# Patient Record
Sex: FEMALE | Race: WHITE | Employment: FULL TIME | URBAN - METROPOLITAN AREA
[De-identification: names, ages, dates, MRNs, and addresses within clinical notes are randomized per-mention and may not be internally consistent; named-entity substitution may affect disease eponyms.]

---

## 2021-02-25 ENCOUNTER — ANESTHESIA EVENT (OUTPATIENT)
Dept: SURGERY | Age: 41
End: 2021-02-25
Payer: COMMERCIAL

## 2021-02-25 RX ORDER — CELECOXIB 200 MG/1
200 CAPSULE ORAL ONCE
Status: CANCELLED | OUTPATIENT
Start: 2021-02-25 | End: 2021-02-25

## 2021-02-26 ENCOUNTER — ANESTHESIA (OUTPATIENT)
Dept: SURGERY | Age: 41
End: 2021-02-26
Payer: COMMERCIAL

## 2021-02-26 ENCOUNTER — HOSPITAL ENCOUNTER (OUTPATIENT)
Age: 41
Setting detail: OUTPATIENT SURGERY
Discharge: HOME | End: 2021-02-26
Attending: ORTHOPAEDIC SURGERY | Admitting: ORTHOPAEDIC SURGERY
Payer: COMMERCIAL

## 2021-02-26 VITALS
OXYGEN SATURATION: 100 % | DIASTOLIC BLOOD PRESSURE: 82 MMHG | BODY MASS INDEX: 43.1 KG/M2 | WEIGHT: 263 LBS | TEMPERATURE: 98.4 F | SYSTOLIC BLOOD PRESSURE: 137 MMHG | RESPIRATION RATE: 12 BRPM | HEART RATE: 63 BPM

## 2021-02-26 LAB — HCG UR QL: NEGATIVE

## 2021-02-26 PROCEDURE — 77030006788 HC BLD SAW OSC STRY -B: Performed by: ORTHOPAEDIC SURGERY

## 2021-02-26 PROCEDURE — 77030002933 HC SUT MCRYL J&J -A: Performed by: ORTHOPAEDIC SURGERY

## 2021-02-26 PROCEDURE — 77030010509 HC AIRWY LMA MSK TELE -A: Performed by: ANESTHESIOLOGY

## 2021-02-26 PROCEDURE — 77030033005 HC TBNG ARTHSC PMP STRY -B: Performed by: ORTHOPAEDIC SURGERY

## 2021-02-26 PROCEDURE — 74011250637 HC RX REV CODE- 250/637: Performed by: ANESTHESIOLOGY

## 2021-02-26 PROCEDURE — 76210000021 HC REC RM PH II 0.5 TO 1 HR: Performed by: ORTHOPAEDIC SURGERY

## 2021-02-26 PROCEDURE — 76060000034 HC ANESTHESIA 1.5 TO 2 HR: Performed by: ORTHOPAEDIC SURGERY

## 2021-02-26 PROCEDURE — 74011250636 HC RX REV CODE- 250/636: Performed by: ANESTHESIOLOGY

## 2021-02-26 PROCEDURE — C1713 ANCHOR/SCREW BN/BN,TIS/BN: HCPCS | Performed by: ORTHOPAEDIC SURGERY

## 2021-02-26 PROCEDURE — 77030002966 HC SUT PDS J&J -A: Performed by: ORTHOPAEDIC SURGERY

## 2021-02-26 PROCEDURE — 74011000250 HC RX REV CODE- 250: Performed by: REGISTERED NURSE

## 2021-02-26 PROCEDURE — 77030002991 HC SUT QUILL SSPC -B: Performed by: ORTHOPAEDIC SURGERY

## 2021-02-26 PROCEDURE — 74011000250 HC RX REV CODE- 250: Performed by: ANESTHESIOLOGY

## 2021-02-26 PROCEDURE — 2709999900 HC NON-CHARGEABLE SUPPLY: Performed by: ORTHOPAEDIC SURGERY

## 2021-02-26 PROCEDURE — 77030002934 HC SUT MCRYL J&J -B: Performed by: ORTHOPAEDIC SURGERY

## 2021-02-26 PROCEDURE — 76942 ECHO GUIDE FOR BIOPSY: CPT | Performed by: ORTHOPAEDIC SURGERY

## 2021-02-26 PROCEDURE — 77030040936 HC WND COBLATN S&N -C: Performed by: ORTHOPAEDIC SURGERY

## 2021-02-26 PROCEDURE — 77030040922 HC BLNKT HYPOTHRM STRY -A: Performed by: ANESTHESIOLOGY

## 2021-02-26 PROCEDURE — 77030006891 HC BLD SHV RESECT STRY -B: Performed by: ORTHOPAEDIC SURGERY

## 2021-02-26 PROCEDURE — 81025 URINE PREGNANCY TEST: CPT

## 2021-02-26 PROCEDURE — 77030002916 HC SUT ETHLN J&J -A: Performed by: ORTHOPAEDIC SURGERY

## 2021-02-26 PROCEDURE — 77030018986 HC SUT ETHBND4 J&J -B: Performed by: ORTHOPAEDIC SURGERY

## 2021-02-26 PROCEDURE — 77030000032 HC CUF TRNQT ZIMM -B: Performed by: ORTHOPAEDIC SURGERY

## 2021-02-26 PROCEDURE — 76210000006 HC OR PH I REC 0.5 TO 1 HR: Performed by: ORTHOPAEDIC SURGERY

## 2021-02-26 PROCEDURE — 74011250636 HC RX REV CODE- 250/636: Performed by: REGISTERED NURSE

## 2021-02-26 PROCEDURE — 76010010054 HC POST OP PAIN BLOCK

## 2021-02-26 PROCEDURE — 76010010054 HC POST OP PAIN BLOCK: Performed by: ORTHOPAEDIC SURGERY

## 2021-02-26 PROCEDURE — C1762 CONN TISS, HUMAN(INC FASCIA): HCPCS | Performed by: ORTHOPAEDIC SURGERY

## 2021-02-26 PROCEDURE — 76010000162 HC OR TIME 1.5 TO 2 HR INTENSV-TIER 1: Performed by: ORTHOPAEDIC SURGERY

## 2021-02-26 PROCEDURE — 74011250636 HC RX REV CODE- 250/636: Performed by: PHYSICIAN ASSISTANT

## 2021-02-26 PROCEDURE — 77030003602 HC NDL NRV BLK BBMI -B: Performed by: ANESTHESIOLOGY

## 2021-02-26 DEVICE — GRAFT BNE L10XW25XH10MM BNE TEND BNE PRESHAPED: Type: IMPLANTABLE DEVICE | Site: KNEE | Status: FUNCTIONAL

## 2021-02-26 DEVICE — BIORCI-HA INTERFERENCE SCREW, 7 MM                                    X 20 MM 7 MM HEAD STERILE
Type: IMPLANTABLE DEVICE | Site: KNEE | Status: FUNCTIONAL
Brand: BIORCI

## 2021-02-26 DEVICE — BIORCI-HA INTERFERENCE SCREW, 7 MM                                    X 25 MM 7 MM HEAD STERILE
Type: IMPLANTABLE DEVICE | Site: KNEE | Status: FUNCTIONAL
Brand: BIORCI

## 2021-02-26 RX ORDER — KETOROLAC TROMETHAMINE 30 MG/ML
INJECTION, SOLUTION INTRAMUSCULAR; INTRAVENOUS AS NEEDED
Status: DISCONTINUED | OUTPATIENT
Start: 2021-02-26 | End: 2021-02-26 | Stop reason: HOSPADM

## 2021-02-26 RX ORDER — SODIUM CHLORIDE, SODIUM LACTATE, POTASSIUM CHLORIDE, CALCIUM CHLORIDE 600; 310; 30; 20 MG/100ML; MG/100ML; MG/100ML; MG/100ML
100 INJECTION, SOLUTION INTRAVENOUS CONTINUOUS
Status: DISCONTINUED | OUTPATIENT
Start: 2021-02-26 | End: 2021-02-26 | Stop reason: HOSPADM

## 2021-02-26 RX ORDER — ACETAMINOPHEN 500 MG
1000 TABLET ORAL ONCE
Status: DISCONTINUED | OUTPATIENT
Start: 2021-02-26 | End: 2021-02-26 | Stop reason: HOSPADM

## 2021-02-26 RX ORDER — MIDAZOLAM HYDROCHLORIDE 1 MG/ML
2 INJECTION, SOLUTION INTRAMUSCULAR; INTRAVENOUS
Status: COMPLETED | OUTPATIENT
Start: 2021-02-26 | End: 2021-02-26

## 2021-02-26 RX ORDER — MIDAZOLAM HYDROCHLORIDE 1 MG/ML
INJECTION, SOLUTION INTRAMUSCULAR; INTRAVENOUS AS NEEDED
Status: DISCONTINUED | OUTPATIENT
Start: 2021-02-26 | End: 2021-02-26

## 2021-02-26 RX ORDER — MIDAZOLAM HYDROCHLORIDE 1 MG/ML
2 INJECTION, SOLUTION INTRAMUSCULAR; INTRAVENOUS
Status: DISCONTINUED | OUTPATIENT
Start: 2021-02-26 | End: 2021-02-26 | Stop reason: HOSPADM

## 2021-02-26 RX ORDER — DEXAMETHASONE SODIUM PHOSPHATE 4 MG/ML
INJECTION, SOLUTION INTRA-ARTICULAR; INTRALESIONAL; INTRAMUSCULAR; INTRAVENOUS; SOFT TISSUE
Status: COMPLETED | OUTPATIENT
Start: 2021-02-26 | End: 2021-02-26

## 2021-02-26 RX ORDER — OXYCODONE HYDROCHLORIDE 5 MG/1
5 TABLET ORAL
Status: COMPLETED | OUTPATIENT
Start: 2021-02-26 | End: 2021-02-26

## 2021-02-26 RX ORDER — FENTANYL CITRATE 50 UG/ML
INJECTION, SOLUTION INTRAMUSCULAR; INTRAVENOUS AS NEEDED
Status: DISCONTINUED | OUTPATIENT
Start: 2021-02-26 | End: 2021-02-26 | Stop reason: HOSPADM

## 2021-02-26 RX ORDER — DIPHENHYDRAMINE HYDROCHLORIDE 50 MG/ML
12.5 INJECTION, SOLUTION INTRAMUSCULAR; INTRAVENOUS
Status: DISCONTINUED | OUTPATIENT
Start: 2021-02-26 | End: 2021-02-26 | Stop reason: HOSPADM

## 2021-02-26 RX ORDER — LIDOCAINE HYDROCHLORIDE 10 MG/ML
0.1 INJECTION INFILTRATION; PERINEURAL AS NEEDED
Status: DISCONTINUED | OUTPATIENT
Start: 2021-02-26 | End: 2021-02-26 | Stop reason: HOSPADM

## 2021-02-26 RX ORDER — DEXAMETHASONE SODIUM PHOSPHATE 4 MG/ML
INJECTION, SOLUTION INTRA-ARTICULAR; INTRALESIONAL; INTRAMUSCULAR; INTRAVENOUS; SOFT TISSUE AS NEEDED
Status: DISCONTINUED | OUTPATIENT
Start: 2021-02-26 | End: 2021-02-26 | Stop reason: HOSPADM

## 2021-02-26 RX ORDER — ONDANSETRON 2 MG/ML
INJECTION INTRAMUSCULAR; INTRAVENOUS AS NEEDED
Status: DISCONTINUED | OUTPATIENT
Start: 2021-02-26 | End: 2021-02-26 | Stop reason: HOSPADM

## 2021-02-26 RX ORDER — PROPOFOL 10 MG/ML
INJECTION, EMULSION INTRAVENOUS AS NEEDED
Status: DISCONTINUED | OUTPATIENT
Start: 2021-02-26 | End: 2021-02-26 | Stop reason: HOSPADM

## 2021-02-26 RX ORDER — HYDROMORPHONE HYDROCHLORIDE 2 MG/ML
0.5 INJECTION, SOLUTION INTRAMUSCULAR; INTRAVENOUS; SUBCUTANEOUS
Status: DISCONTINUED | OUTPATIENT
Start: 2021-02-26 | End: 2021-02-26 | Stop reason: HOSPADM

## 2021-02-26 RX ORDER — NALOXONE HYDROCHLORIDE 0.4 MG/ML
0.1 INJECTION, SOLUTION INTRAMUSCULAR; INTRAVENOUS; SUBCUTANEOUS
Status: DISCONTINUED | OUTPATIENT
Start: 2021-02-26 | End: 2021-02-26 | Stop reason: HOSPADM

## 2021-02-26 RX ORDER — SODIUM CHLORIDE, SODIUM LACTATE, POTASSIUM CHLORIDE, CALCIUM CHLORIDE 600; 310; 30; 20 MG/100ML; MG/100ML; MG/100ML; MG/100ML
75 INJECTION, SOLUTION INTRAVENOUS CONTINUOUS
Status: DISCONTINUED | OUTPATIENT
Start: 2021-02-26 | End: 2021-02-26 | Stop reason: HOSPADM

## 2021-02-26 RX ORDER — CEFAZOLIN SODIUM/WATER 2 G/20 ML
2 SYRINGE (ML) INTRAVENOUS ONCE
Status: COMPLETED | OUTPATIENT
Start: 2021-02-26 | End: 2021-02-26

## 2021-02-26 RX ORDER — FENTANYL CITRATE 50 UG/ML
100 INJECTION, SOLUTION INTRAMUSCULAR; INTRAVENOUS
Status: COMPLETED | OUTPATIENT
Start: 2021-02-26 | End: 2021-02-26

## 2021-02-26 RX ORDER — HALOPERIDOL 5 MG/ML
1 INJECTION INTRAMUSCULAR
Status: DISCONTINUED | OUTPATIENT
Start: 2021-02-26 | End: 2021-02-26 | Stop reason: HOSPADM

## 2021-02-26 RX ORDER — LIDOCAINE HYDROCHLORIDE 20 MG/ML
INJECTION, SOLUTION EPIDURAL; INFILTRATION; INTRACAUDAL; PERINEURAL AS NEEDED
Status: DISCONTINUED | OUTPATIENT
Start: 2021-02-26 | End: 2021-02-26 | Stop reason: HOSPADM

## 2021-02-26 RX ORDER — FLUMAZENIL 0.1 MG/ML
0.2 INJECTION INTRAVENOUS
Status: DISCONTINUED | OUTPATIENT
Start: 2021-02-26 | End: 2021-02-26 | Stop reason: HOSPADM

## 2021-02-26 RX ADMIN — FENTANYL CITRATE 100 MCG: 50 INJECTION, SOLUTION INTRAMUSCULAR; INTRAVENOUS at 07:39

## 2021-02-26 RX ADMIN — ROPIVACAINE HYDROCHLORIDE 30 ML: 5 INJECTION, SOLUTION EPIDURAL; INFILTRATION; PERINEURAL at 07:52

## 2021-02-26 RX ADMIN — LIDOCAINE HYDROCHLORIDE 60 MG: 20 INJECTION, SOLUTION EPIDURAL; INFILTRATION; INTRACAUDAL; PERINEURAL at 08:41

## 2021-02-26 RX ADMIN — DEXAMETHASONE SODIUM PHOSPHATE 2 MG: 4 INJECTION, SOLUTION INTRAMUSCULAR; INTRAVENOUS at 07:52

## 2021-02-26 RX ADMIN — SODIUM CHLORIDE, SODIUM LACTATE, POTASSIUM CHLORIDE, AND CALCIUM CHLORIDE: 600; 310; 30; 20 INJECTION, SOLUTION INTRAVENOUS at 09:27

## 2021-02-26 RX ADMIN — DEXAMETHASONE SODIUM PHOSPHATE 8 MG: 4 INJECTION, SOLUTION INTRAMUSCULAR; INTRAVENOUS at 08:49

## 2021-02-26 RX ADMIN — OXYCODONE 5 MG: 5 TABLET ORAL at 10:50

## 2021-02-26 RX ADMIN — MIDAZOLAM 2 MG: 1 INJECTION INTRAMUSCULAR; INTRAVENOUS at 07:54

## 2021-02-26 RX ADMIN — MIDAZOLAM 2 MG: 1 INJECTION INTRAMUSCULAR; INTRAVENOUS at 07:39

## 2021-02-26 RX ADMIN — FENTANYL CITRATE 25 MCG: 50 INJECTION INTRAMUSCULAR; INTRAVENOUS at 09:34

## 2021-02-26 RX ADMIN — FENTANYL CITRATE 50 MCG: 50 INJECTION INTRAMUSCULAR; INTRAVENOUS at 08:57

## 2021-02-26 RX ADMIN — KETOROLAC TROMETHAMINE 30 MG: 30 INJECTION, SOLUTION INTRAMUSCULAR at 09:45

## 2021-02-26 RX ADMIN — HYDROMORPHONE HYDROCHLORIDE 0.5 MG: 2 INJECTION INTRAMUSCULAR; INTRAVENOUS; SUBCUTANEOUS at 10:20

## 2021-02-26 RX ADMIN — DEXAMETHASONE SODIUM PHOSPHATE 2 MG: 4 INJECTION, SOLUTION INTRAMUSCULAR; INTRAVENOUS at 07:56

## 2021-02-26 RX ADMIN — FENTANYL CITRATE 50 MCG: 50 INJECTION INTRAMUSCULAR; INTRAVENOUS at 08:36

## 2021-02-26 RX ADMIN — CEFAZOLIN 2 G: 1 INJECTION, POWDER, FOR SOLUTION INTRAVENOUS at 08:43

## 2021-02-26 RX ADMIN — ROPIVACAINE HYDROCHLORIDE 20 ML: 5 INJECTION, SOLUTION EPIDURAL; INFILTRATION; PERINEURAL at 07:56

## 2021-02-26 RX ADMIN — FENTANYL CITRATE 25 MCG: 50 INJECTION INTRAMUSCULAR; INTRAVENOUS at 09:31

## 2021-02-26 RX ADMIN — SODIUM CHLORIDE, SODIUM LACTATE, POTASSIUM CHLORIDE, AND CALCIUM CHLORIDE 100 ML/HR: 600; 310; 30; 20 INJECTION, SOLUTION INTRAVENOUS at 07:09

## 2021-02-26 RX ADMIN — ONDANSETRON 4 MG: 2 INJECTION INTRAMUSCULAR; INTRAVENOUS at 09:45

## 2021-02-26 RX ADMIN — FENTANYL CITRATE 50 MCG: 50 INJECTION INTRAMUSCULAR; INTRAVENOUS at 09:07

## 2021-02-26 RX ADMIN — PROPOFOL 200 MG: 10 INJECTION, EMULSION INTRAVENOUS at 08:41

## 2021-02-26 NOTE — OP NOTES
[address] [place] [address]  OPERATIVE REPORT    Name:  Winnie Sesay  MR#:  [id number]  :  1980  ACCOUNT #:  [de-identified]  DATE OF SERVICE:  2021    PREOPERATIVE DIAGNOSES:  1. Anterior cruciate ligament tear, left knee. 2.  Lateral meniscal tear and chondromalacia of lateral femoral condyle - lateral compartment, left knee. 3.  Medial meniscal tear - medial compartment, left knee. 4.  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 5.  Chondromalacia of patella and trochlea - patellofemoral compartment, right knee. 6.  Lateral meniscal tear and chondromalacia, lateral femoral condyle - lateral compartment, right knee. POSTOPERATIVE DIAGNOSES:  1. Anterior cruciate ligament tear, left knee. 2.  Lateral meniscal tear and chondromalacia of lateral femoral condyle - lateral compartment, left knee. 3.  Medial meniscal tear - medial compartment, left knee. 4.  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 5.  Chondromalacia of patella and trochlea - patellofemoral compartment, right knee. 6.  Lateral meniscal tear and chondromalacia, lateral femoral condyle - lateral compartment, right knee. PROCEDURE PERFORMED:  1.   Left anterior cruciate ligament reconstruction - CPT .  2.  Left knee arthroscopy with partial lateral meniscectomy and chondroplasty of lateral femoral condyle - lateral compartment - CPT 56823.  3.  Left knee arthroscopy with partial medial meniscectomy - medial compartment - CPT 92577.  4.  Left knee arthroscopy with abrasion chondroplasty of trochlea and chondroplasty of patella - patellofemoral compartment - CPT 44035.  5.  Right knee arthroscopy with abrasion arthroplasty of patella and chondroplasty of trochlea - patellofemoral compartment - CPT 96952.  6.  Right knee arthroscopy with partial lateral meniscectomy and chondroplasty of lateral femoral condyle - lateral compartment - CPT 32876.    SURGEON:  Jitendra Flores MD    ASSISTANT:  SIMEON Devlin    ANESTHESIA:  General.    FLUIDS:  Crystalloid. COMPLICATIONS:  None. SPECIMENS REMOVED:  None. IMPLANTS:  Yes, see record. ESTIMATED BLOOD LOSS:  Minimal.    FINDINGS:  The right knee showed a tear of the anterior horn and body of the lateral meniscus. There was some grade II, III chondromalacia of the lateral femoral condyle, 1.5 x 1.5 cm. There was grade II, III, IV chondromalacia of the patella and II, III chondromalacia of the trochlea, 2 x 2 cm. The left knee showed a complete tear of the anterior cruciate ligament. There was extensive tearing of the body of the medial meniscus and tearing of the body of the lateral meniscus. There was grade II, III chondromalacia, 1.5 x 2 cm, of the lateral femoral condyle. There was grade II, III, IV chondromalacia of the trochlea and II, III chondromalacia of the patella, 2 x 2 cm. PROCEDURE:  After informed consent, the patient was brought to the operating room and placed on the operating room table in the supine position. General anesthesia was administered without difficulty. Tourniquet was applied to the right and left upper thighs. Right and left knees and legs were prepped and draped in sterile fashion. Attention was directed to the right knee. Tourniquet was inflated. Standard inferomedial and inferolateral portals were made for scope and instruments. The knee was then arthroscoped in a sequential manner. The aforementioned findings were noted. Attention was directed to the patellofemoral compartment. A chondroplasty of the patella and trochlea was performed. All loose cartilage flaps were removed. There were no sharp edges or unstable fragments after the chondroplasty. There was an area of exposed bone and a contained defect on the patella. Abrasion arthroplasty was performed down to bleeding subchondral bone without difficulty.     Attention was directed to the lateral compartment of the knee. A partial lateral meniscectomy was performed. All the torn portion was removed. At the termination of the partial lateral meniscectomy, the remaining meniscal rim had a smooth contour. There were no sharp edges or unstable fragments. A chondroplasty of the lateral femoral condyle was performed back to smooth stable area. The knee was thoroughly irrigated. Portals were closed. Sterile dressings were applied. Tourniquet was deflated. Attention was directed to the left knee. Tourniquet was inflated. Standard inferomedial and inferolateral portals were made for scope and instruments. The knee was then arthroscoped in a sequential manner. The aforementioned findings were noted. Attention was directed to the patellofemoral compartment. A chondroplasty of the patella and trochlea was performed. All loose cartilage flaps were removed. There were no sharp edges or unstable fragments after the chondroplasty. There was an area of exposed bone and a contained defect on the trochlea. The pick was used to produce a microfracture every 3 mm in this area. The abrasion chondroplasty was performed without difficulty. Attention was directed to the medial compartment of the knee. A partial medial meniscectomy was performed. All of the torn portion was removed. At the termination of the partial medial meniscectomy, the remaining meniscal rim had a smooth contour. There were no sharp edges or unstable fragments. Attention was directed to the lateral compartment of the knee. A partial lateral meniscectomy was performed. All the torn portion was removed. There were no sharp edges or unstable fragments after the partial lateral meniscectomy. A chondroplasty of the lateral femoral condyle was performed back to smooth stable area. Attention was directed to the intercondylar notch. The ACL stump was debrided.   A notchplasty was performed all the way to the posterior periosteal fringe at the over-the-top position. A reference point was made just anterior to this at the 2 o'clock position for future femoral tunnel placement. A proximal medial incision was made over the tibia and a guide pin was placed from the proximal medial tibia up to the center of the old ACL footprint in line with the leading edge of the lateral meniscus just anterior to the medial tibial spine. This was over reamed. A femoral tunnel was made at the aforementioned reference point to a depth of 25 mm with a reamer. At the termination of the femoral tunnel preparation, there was a 1-2-mm posterior cortical rim with circumferential bone noted within the tunnel. The bone-tendon-bone allograft had been prepared in usual fashion on the back table. The graft was passed up through the knee and affixed on the femoral side with a 7 x 25-mm bioabsorbable interference screw. This had excellent purchase. TUG testing revealed stable fixation at this interface. The graft was then tensioned to the tibial bone plug sutures. A posterior drawer was applied with the knee in 20 degrees of flexion and it was affixed on the tibial side with a 7 x 20-mm bioabsorbable interference screw. This had excellent purchase. The graft was then viewed arthroscopically. It had excellent tension and anatomic position. It did not impinge through full range of motion. Clinically, the patient had full motion. She had a negative Lachman's with a firm endpoint and negative pivot. Knee was irrigated. Wounds were closed. Sterile dressings and a brace were applied. The patient was taken to the recovery room in stable condition. SIMEON Parks, assisted during the procedure. He was necessary for patient positioning during the procedure, especially given that it was bilateral and the size of the patient's extremities.   He was also responsible for wound closure, graft preparation, and assistance with the major portions of the operations. His presence decreased the operative time and potential complication rate.     MD TANNA [id number]  D:  02/26/2021 10:59  T:  02/26/2021 11:49  JOB #:  [id number]

## 2021-02-26 NOTE — H&P
Outpatient Surgery History and Physical:  Liliana Edge was seen and examined. CHIEF COMPLAINT:   Bilateral knee pain. PE:     Visit Vitals  /68 (BP 1 Location: Left arm, BP Patient Position: Supine)   Pulse 82   Temp 98.3 °F (36.8 °C)   Resp 18   Wt 119.3 kg (263 lb)   SpO2 95%   BMI 43.10 kg/m²       Heart:   Regular rhythm      Lungs:  Are clear      Past Medical History:    Patient Active Problem List    Diagnosis    JAGDISH (obstructive sleep apnea)    Gastroesophageal reflux disease    Hyperlipidemia    Morbid obesity with BMI of 50.0-59.9, adult (HCC)    Hypertension    Asthma    Chronic back pain       Surgical History:   Past Surgical History:   Procedure Laterality Date    HX CHOLECYSTECTOMY      HX CRANIOTOMY      HX KNEE ARTHROSCOPY Left     HX OTHER SURGICAL      ACL repair left new    HX TONSILLECTOMY         Social History: Patient  reports that she has quit smoking. She has never used smokeless tobacco. She reports current alcohol use. She reports that she does not use drugs. Family History:   Family History   Problem Relation Age of Onset    Hypertension Father     Diabetes Father     Cancer Father        Allergies: Reviewed per EMR  Allergies   Allergen Reactions    Ciprofloxacin Rash    Penicillins Rash       Medications:    No current facility-administered medications on file prior to encounter. Current Outpatient Medications on File Prior to Encounter   Medication Sig    traZODone (DESYREL) 100 mg tablet Take 100 mg by mouth nightly.  pravastatin (PRAVACHOL) 80 mg tablet Take 80 mg by mouth nightly.  NORETHINDRONE (MICRONOR, 28, PO) Take  by mouth nightly.  albuterol (PROVENTIL HFA, VENTOLIN HFA, PROAIR HFA) 90 mcg/actuation inhaler Take  by inhalation.  ranitidine (ZANTAC) 150 mg tablet Take 150 mg by mouth daily.  triamcinolone acetonide (KENALOG) 0.1 % topical cream Apply  to affected area two (2) times a day.  use thin layer       The surgery is planned for the right knee arthroscopy and left knee arthroscopy ACL reconstruction with BTB allograft. History and physical has been reviewed. The patient has been examined. There have been no significant clinical changes since the completion of the originally dated History and Physical.  Patient identified by surgeon; surgical site was confirmed by patient and surgeon. The patient is here today for outpatient surgery. I have examined the patient, no changes are noted in the patient's medical status. Necessity for the procedure/care is still present and the history and physical above is current. See the office notes for the full long term history of the problem. Please see the recent office notes for the musculoskeletal examination.     Signed By: SIMEON Lo     February 26, 2021 6:42 AM

## 2021-02-26 NOTE — PROGRESS NOTES
's pre-procedure visit and prayer with patient as requested.     Anmol Mcleod MDiv, BS  Board Certified

## 2021-02-26 NOTE — ANESTHESIA POSTPROCEDURE EVALUATION
Procedure(s):  RIGHT KNEE ARTHROSCOPY with partial lateral meniscectomy AND LEFT KNEE ARTHROSCOPY WITH ANTERIOR CRUCIATE LIGAMENT RECONSTRUCTION WITH ALLOGRAFT.     general    Anesthesia Post Evaluation      Multimodal analgesia: multimodal analgesia used between 6 hours prior to anesthesia start to PACU discharge  Patient location during evaluation: PACU  Patient participation: complete - patient participated  Level of consciousness: awake  Pain management: adequate  Airway patency: patent  Anesthetic complications: no  Cardiovascular status: acceptable  Respiratory status: acceptable, spontaneous ventilation and nonlabored ventilation  Hydration status: acceptable  Post anesthesia nausea and vomiting:  none      INITIAL Post-op Vital signs:   Vitals Value Taken Time   /84 02/26/21 1049   Temp 36.9 °C (98.4 °F) 02/26/21 1049   Pulse 72 02/26/21 1049   Resp 12 02/26/21 1049   SpO2 96 % 02/26/21 1049

## 2021-02-26 NOTE — ANESTHESIA PROCEDURE NOTES
Peripheral Block    Start time: 2/26/2021 7:54 AM  End time: 2/26/2021 7:56 AM  Performed by: Kye Carranza MD  Authorized by: Kye Carranza MD       Pre-procedure: Indications: at surgeon's request and post-op pain management    Preanesthetic Checklist: patient identified, risks and benefits discussed, site marked, timeout performed, anesthesia consent given and patient being monitored    Timeout Time: 07:53          Block Type:   Block Type:  Femoral single shot  Laterality:  Left  Monitoring:  Standard ASA monitoring, continuous pulse ox, frequent vital sign checks, heart rate, responsive to questions and oxygen  Injection Technique:  Single shot  Procedures: ultrasound guided and nerve stimulator    Patient Position: supine  Prep: chlorhexidine    Location:  Upper thigh  Needle Type:  Stimuplex  Needle Gauge:  22 G  Needle Localization:  Ultrasound guidance and nerve stimulator  Motor Response comment:   Motor Response: minimal motor response >0.4 mA   Medication Injected:  Ropivacaine 0.375% with epi 1:200,000 in NS injection, 20 mL (Mixture components: ropivacaine (PF) 5 mg/mL (0.5 %) Soln, . 75 mL; EPINEPHrine HCl (PF) 1 mg/mL (1 mL) Soln, . 005 mL; 0.9% sodium chloride Soln, .245 mL)  dexamethasone (DECADRON) 4 mg/mL injection, 2 mg  Med Admin Time: 2/26/2021 7:56 AM    Assessment:  Number of attempts:  1  Injection Assessment:  Incremental injection every 5 mL, local visualized surrounding nerve on ultrasound, negative aspiration for CSF, negative aspiration for blood, no paresthesia, no intravascular symptoms and ultrasound image on chart  Patient tolerance:  Patient tolerated the procedure well with no immediate complications

## 2021-02-26 NOTE — PERIOP NOTES
PACU DISCHARGE NOTE    Vital signs stable, pain well controlled, alert and oriented times three or at baseline, follow up per surgeon, no anesthetic complications. Discharge instructions and prescriptions for Oxycodone and Zofran given to pt, her mother, and her . Pt and her  asked for Oxycodone prescription to be shredded as pt is in pain management. Prescription shredded. Pt is tolerating PO and has had her IV removed intact. Pt to discharge door via wheelchair and left in care of her family.

## 2021-02-26 NOTE — ANESTHESIA PREPROCEDURE EVALUATION
Anesthetic History     PONV          Review of Systems / Medical History  Patient summary reviewed and pertinent labs reviewed    Pulmonary        Sleep apnea: No treatment    Asthma : well controlled       Neuro/Psych   Within defined limits          Comments: H/o benign brain tumor. Occipital region. Resected in 2009 and no sequelae.  Cardiovascular    Hypertension (Not currently on medication)              Exercise tolerance: >4 METS     GI/Hepatic/Renal     GERD (Rare symptoms): well controlled           Endo/Other        Morbid obesity     Other Findings              Physical Exam    Airway  Mallampati: II  TM Distance: > 6 cm  Neck ROM: normal range of motion   Mouth opening: Normal     Cardiovascular  Regular rate and rhythm,  S1 and S2 normal,  no murmur, click, rub, or gallop          Pertinent negatives: No murmur   Dental  No notable dental hx       Pulmonary  Breath sounds clear to auscultation               Abdominal  GI exam deferred       Other Findings            Anesthetic Plan    ASA: 3  Anesthesia type: general      Post-op pain plan if not by surgeon: peripheral nerve block single    Induction: Intravenous  Anesthetic plan and risks discussed with: Patient

## 2021-02-26 NOTE — DISCHARGE INSTRUCTIONS
Post-Operative Instructions   For  Anterior Cruciate Ligament Reconstruction  Phone:  [phone]    1. Unless otherwise instructed, you may place as much weight as tolerated on the operated leg. Use crutches to help with ambulation. 2.  If you do not have an \"Iceman\" type cooling unit, for the first 48-72 hours following surgery, use ice on the knee every two hours (while awake) for 20-30 minutes at a time to help prevent swelling and lessen pain. If you have a cooling unit, follow the instructions given to you- continually as much as possible the first 48-72 hours, then 3-4 times a day for 4 weeks. Elevate leg. 3. You have been given a hinged brace. Keep the brace locked in a straight position at all times until you begin therapy. 4. You may remove the brace to shower and wrap the dressings to keep them dry. 5. Use any pain medication as instructed. You should take your pain medication as soon as you feel the anesthetic wearing off. Do not wait until you are in severe pain to begin taking your pain medication. 6. You may have some side effects from your pain medication. If you have nausea, try taking your medication with food. For itching, you may take over the counter Benadryl. 7. Begin therapy as ordered    8. You may have been given a prescription for Zofran or Phenergan. This medication is used for nausea and vomiting. You do not need to get this prescription filled unless you have a problem. 9. If you have a problem, please call [address] [place] [address] at [phone]    [facility], [address] [place] [address], [facility] Post-Operative Instructions   For  Knee Arthroscopy  Phone:  [phone]    1. Unless otherwise instructed, you may place as much weight on your leg as you wish.     2. If you do not have an \"Iceman\" type cooling unit, for the first 48-72 hours following surgery, use ice on the knee every two hours (while awake) for 20-30 minutes at a time to help prevent swelling and lessen pain. If you have a cooling unit, follow the instructions given to you- continually as much as possible the first 48-72 hours, then 3-4 times a day for 4 weeks. Elevate leg. 3. Perform at least 30 to 50 leg-raising exercises twice a day. Begin exercise as soon as pain allows. Also perform gentle active motion of the knee as soon as pain allows. 4. On the third day after surgery, remove the dressing. Leave steri-strips on, they eventually will peel off.      5. Use any pain medication as instructed. You should take your pain medication as soon as you feel the anesthetic wearing off. Do not wait until you are in severe pain to begin taking your pain medication. 6. You may have some side effects from your pain medication. If you have nausea, try taking your medication with food. For itching, you may take over the counter Benadryl. 7. You may shower as soon as desired. The first three days after surgery, wrap the dressings in saran wrap to keep them dry when showering. 8. You may have been given a prescription for Zofran or Phenergan. This medication is used for nausea and vomiting. You do not need to get this prescription filled unless you have a problem. 9. If you have a problem, please call [address] [place] [address] at [phone]    [facility] ACTIVITY  · As tolerated and as directed by your doctor. · Bathe or shower as directed by your doctor. DIET  · Clear liquids until no nausea or vomiting; then light diet for the first day. · Advance to regular diet on second day, unless your doctor orders otherwise. · If nausea and vomiting continues, call your doctor. PAIN  · Take pain medication as directed by your doctor. · Call your doctor if pain is NOT relieved by medication.        CALL YOUR DOCTOR IF   · Excessive bleeding that does not stop after holding pressure over the area  · Temperature of 101 degrees F or above  · Excessive redness, swelling or bruising, and/ or green or yellow, smelly discharge from incision        After general anesthesia or intravenous sedation, for 24 hours or while taking prescription Narcotics:  · Limit your activities  · Do not drive and operate hazardous machinery  · Do not make important personal or business decisions  · Do  not drink alcoholic beverages  · If you have not urinated within 8 hours after discharge, please contact your surgeon on call.    *  Please give a list of your current medications to your Primary Care Provider.    *  Please update this list whenever your medications are discontinued, doses are      changed, or new medications (including over-the-counter products) are added.    *  Please carry medication information at all times in case of emergency situations.      These are general instructions for a healthy lifestyle:    No smoking/ No tobacco products/ Avoid exposure to second hand smoke    Surgeon General's Warning:  Quitting smoking now greatly reduces serious risk to your health.    Obesity, smoking, and sedentary lifestyle greatly increases your risk for illness    A healthy diet, regular physical exercise & weight monitoring are important for maintaining a healthy lifestyle    You may be retaining fluid if you have a history of heart failure or if you experience any of the following symptoms:  Weight gain of 3 pounds or more overnight or 5 pounds in a week, increased swelling in our hands or feet or shortness of breath while lying flat in bed.  Please call your doctor as soon as you notice any of these symptoms; do not wait until your next office visit.    Recognize signs and symptoms of STROKE:    F-face looks uneven    A-arms unable to move or move unevenly    S-speech slurred or non-existent    T-time-call 911 as soon as signs and symptoms begin-DO NOT go       Back to bed or wait to see if you get better-TIME IS  BRAIN. Advance Care Planning  People with COVID-19 may have no symptoms, mild symptoms, such as fever, cough, and shortness of breath or they may have more severe illness, developing severe and fatal pneumonia. As a result, Advance Care Planning with attention to naming a health care decision maker (someone you trust to make healthcare decisions for you if you could not speak for yourself) and sharing other health care preferences is important BEFORE a possible health crisis. Please contact your Primary Care Provider to discuss Advance Care Planning. Preventing the Spread of Coronavirus Disease 2019 in Homes and Residential Communities  For the most recent information go to [email or web address]    Prevention steps for People with confirmed or suspected COVID-19 (including persons under investigation) who do not need to be hospitalized  and   People with confirmed COVID-19 who were hospitalized and determined to be medically stable to go home    Your healthcare provider and public health staff will evaluate whether you can be cared for at home. If it is determined that you do not need to be hospitalized and can be isolated at home, you will be monitored by staff from your local or state health department. You should follow the prevention steps below until a healthcare provider or local or state health department says you can return to your normal activities. Stay home except to get medical care  People who are mildly ill with COVID-19 are able to isolate at home during their illness. You should restrict activities outside your home, except for getting medical care. Do not go to work, school, or public areas. Avoid using public transportation, ride-sharing, or taxis. Separate yourself from other people and animals in your home  People: As much as possible, you should stay in a specific room and away from other people in your home.  Also, you should use a separate bathroom, if available. Animals: You should restrict contact with pets and other animals while you are sick with COVID-19, just like you would around other people. Although there have not been reports of pets or other animals becoming sick with COVID-19, it is still recommended that people sick with COVID-19 limit contact with animals until more information is known about the virus. When possible, have another member of your household care for your animals while you are sick. If you are sick with COVID-19, avoid contact with your pet, including petting, snuggling, being kissed or licked, and sharing food. If you must care for your pet or be around animals while you are sick, wash your hands before and after you interact with pets and wear a facemask. Call ahead before visiting your doctor  If you have a medical appointment, call the healthcare provider and tell them that you have or may have COVID-19. This will help the healthcare providers office take steps to keep other people from getting infected or exposed. Wear a facemask  You should wear a facemask when you are around other people (e.g., sharing a room or vehicle) or pets and before you enter a healthcare providers office. If you are not able to wear a facemask (for example, because it causes trouble breathing), then people who live with you should not stay in the same room with you, or they should wear a facemask if they enter your room. Cover your coughs and sneezes  Cover your mouth and nose with a tissue when you cough or sneeze. Throw used tissues in a lined trash can. Immediately wash your hands with soap and water for at least 20 seconds or, if soap and water are not available, clean your hands with an alcohol-based hand  that contains at least 60% alcohol.   Clean your hands often  Wash your hands often with soap and water for at least 20 seconds, especially after blowing your nose, coughing, or sneezing; going to the bathroom; and before eating or preparing food. If soap and water are not readily available, use an alcohol-based hand  with at least 60% alcohol, covering all surfaces of your hands and rubbing them together until they feel dry. Soap and water are the best option if hands are visibly dirty. Avoid touching your eyes, nose, and mouth with unwashed hands. Avoid sharing personal household items  You should not share dishes, drinking glasses, cups, eating utensils, towels, or bedding with other people or pets in your home. After using these items, they should be washed thoroughly with soap and water. Clean all high-touch surfaces everyday  High touch surfaces include counters, tabletops, doorknobs, bathroom fixtures, toilets, phones, keyboards, tablets, and bedside tables. Also, clean any surfaces that may have blood, stool, or body fluids on them. Use a household cleaning spray or wipe, according to the label instructions. Labels contain instructions for safe and effective use of the cleaning product including precautions you should take when applying the product, such as wearing gloves and making sure you have good ventilation during use of the product. Monitor your symptoms  Seek prompt medical attention if your illness is worsening (e.g., difficulty breathing). Before seeking care, call your healthcare provider and tell them that you have, or are being evaluated for, COVID-19. Put on a facemask before you enter the facility. These steps will help the healthcare providers office to keep other people in the office or waiting room from getting infected or exposed. Ask your healthcare provider to call the local or state health department. Persons who are placed under active monitoring or facilitated self-monitoring should follow instructions provided by their local health department or occupational health professionals, as appropriate. When working with your local health department check their available hours.   If you have a medical emergency and need to call 911, notify the dispatch personnel that you have, or are being evaluated for COVID-19. If possible, put on a facemask before emergency medical services arrive. Discontinuing home isolation  Patients with confirmed COVID-19 should remain under home isolation precautions until the risk of secondary transmission to others is thought to be low. The decision to discontinue home isolation precautions should be made on a case-by-case basis, in consultation with healthcare providers and state and local health departments.

## 2021-02-26 NOTE — ANESTHESIA PROCEDURE NOTES
Peripheral Block    Start time: 2/26/2021 7:40 AM  End time: 2/26/2021 7:52 AM  Performed by: Sarrah Fleischer, MD  Authorized by: Sarrah Fleischer, MD       Pre-procedure: Indications: at surgeon's request and post-op pain management    Preanesthetic Checklist: patient identified, risks and benefits discussed, site marked, timeout performed, anesthesia consent given and patient being monitored    Timeout Time: 07:38          Block Type:   Block Type:  Sciatic single shot  Laterality:  Left  Monitoring:  Standard ASA monitoring, continuous pulse ox, frequent vital sign checks, heart rate, oxygen and responsive to questions  Injection Technique:  Single shot  Procedures: ultrasound guided and nerve stimulator    Patient Position: supine  Prep: chlorhexidine    Location:  Upper thigh  Needle Type:  Stimuplex  Needle Gauge:  22 G  Needle Localization:  Anatomical landmarks, nerve stimulator and ultrasound guidance  Motor Response comment:   Motor Response: minimal motor response >0.4 mA   Medication Injected:  Ropivacaine 0.375% with epi 1:200,000 in NS injection, 30 mL (Mixture components: ropivacaine (PF) 5 mg/mL (0.5 %) Soln, . 75 mL; EPINEPHrine HCl (PF) 1 mg/mL (1 mL) Soln, . 005 mL; 0.9% sodium chloride Soln, .245 mL)  dexamethasone (DECADRON) 4 mg/mL injection, 2 mg  Med Admin Time: 2/26/2021 7:52 AM    Assessment:  Number of attempts:  1  Injection Assessment:  Incremental injection every 5 mL, local visualized surrounding nerve on ultrasound, negative aspiration for blood, no paresthesia, no intravascular symptoms and ultrasound image on chart  Patient tolerance:  Patient tolerated the procedure well with no immediate complications

## 2021-03-01 NOTE — PERIOP NOTES
Spoke with the mother and she said that the left knee is swollen than the right knee. She will call the  Doctor and let her know.

## 2021-03-02 ENCOUNTER — HOSPITAL ENCOUNTER (OUTPATIENT)
Dept: PHYSICAL THERAPY | Age: 41
Discharge: HOME | End: 2021-03-02
Payer: COMMERCIAL

## 2021-03-02 PROCEDURE — 97016 VASOPNEUMATIC DEVICE THERAPY: CPT

## 2021-03-02 PROCEDURE — 97110 THERAPEUTIC EXERCISES: CPT

## 2021-03-02 PROCEDURE — 97162 PT EVAL MOD COMPLEX 30 MIN: CPT

## 2021-03-02 NOTE — PROGRESS NOTES
Ada Resendiz  : 1980  Primary: [facility]  Secondary:  [address] @ [address], [facility] [place].  [phone]   [phone]      OUTPATIENT PHYSICAL THERAPY: Daily Treatment Note  3/2/2021   ICD-10: Treatment Diagnosis: Pain in right knee (M25.561), Stiffness of right knee, not elsewhere classified (M25.661), Pain in left knee (M25.562) and Stiffness of left knee, not elsewhere classified (M25.662) and Muscle weakness (generalized) (M62.81) and Other abnormalities of gait and mobility (R26.89)     L ACL allograft, B chondroplasty and meniscectomy 21    Effective Dates: 3/2/2021 TO 2021 (90 days). Frequency/Duration: 2-3 times a week for 90 Days  GOALS: (Goals have been discussed and agreed upon with patient.)  Short-Term Functional Goals: Time Frame: 4 weeks  1. Pt to report compliance with HEP  2. Pt to achieve 0-125 AROM B knees for normal function and mechanics  3. Pt to demonstrate normal gait level surfaces without assistive device  4. Pt to jun progressive ex program without increased pain or swelling. Discharge Goals: Time Frame: 16 weeks  1. Pt to increase strength for reciprocal gait on stairs  2. Pt to increase SLS > 20 sec B for safe amb all surfaces  3. Pt to resume working without limitation inc navigating stairs consistently  4. Pt to resume walking dogs without limitations  _______________________________________________________________________________  Pre-treatment Symptoms/Complaints:  Ms. Ailyn Fernandez presents with B knee pain, stiffness, weakness, swelling, decreased balance and gait abnormalities following B meniscectomies, chondroplasty, and L ACL reconstruction.    Pain: Initial: 4/10 Post Session:  1/10   Medications Last Reviewed:  3/2/2021    Updated Objective Findings:      Observation/Orthostatic Postural Assessment:          Steri-strips intact R med and lat portals; dressing with tegaderm intact over L knee with thick black dried blood contained;  Wearing brace locked at 0 on L   Palpation:          Good patellar mobility B  ROM:         AROM  Knee flex-ext R 102-0, 83-0 L        Calf tightness B  Strength:         Good quad set R, fair quad set L;  Further strength testing deferred at today's visit  Functional Mobility:         Gait/Ambulation:  Pt leans on B axillary crutches with step-to gait pattern, brace at 0        Transfers:  Pt uses UE assist and R LE         Stairs:  Step-to pattern or avoids currrently  Balance:          Unable to assess at today's visit    Tool Used: Lower Extremity Functional Scale (LEFS)  Score:  Initial: 8/80 Most Recent: X/80 (Date: -- )   Interpretation of Score: 20 questions each scored on a 5 point scale with 0 representing \"extreme difficulty or unable to perform\" and 4 representing \"no difficulty\". The lower the score, the greater the functional disability. 80/80 represents no disability. Minimal detectable change is 9 points. All objective findings are as at initial evaluation unless otherwise noted     TREATMENT:   THERAPEUTIC ACTIVITY: ( see below for minutes): Therapeutic activities per grid below to improve mobility. Required moderate verbal and manual cues to improve functional mobility . THERAPEUTIC EXERCISE: (see below for minutes):  Exercises per grid below to improve strength. Required moderate verbal and manual cues to promote proper body alignment, promote proper body posture, promote proper body mechanics and promote proper body breathing techniques. Progressed resistance, range, repetitions and complexity of movement as indicated. MANUAL THERAPY: (see below for minutes): Joint mobilization and Soft tissue mobilization was utilized and necessary because of the patient's restricted joint motion, painful spasm, loss of articular motion and restricted motion of soft tissue.    MODALITIES: (see below for minutes):      for pain modulation    AQUATIC THERAPY (see below for minutes): Aquatic treatment performed per flow grid for Decreased muscle strength, Decreased endurance, Decreased static/dynamic balance and reactive control, Decreased activity endurance, Decompression, Ease of movement and Low impact and reduced weight bearing activity. Date: 3/2/21       Modalities: 15 mins       Game ready B knees seated               Manual Therapy:                        Therapeutic Exercises: 25 mins       Pt education, postural education, HEP, functional breathing, patella mobs 15 mins       Quad set B X 10       SLR B X 10       gastroc stretch B Actively or with strap       Gait mechanics level surfaces With crutches               HEP: see hand out    [employer] Portal  Treatment/Session Summary:    · Response to Treatment:  Pt had good understanding and jun to information presented. We discussed possibly using a walker for more stability as pt was somewhat unsteady with crutches. Worked on gait mechanics so pt to decide how she feels the safest going forward. Also discussed using stockinette vs ace wrap vs compression sock as the ace wrap and stockinette both roll down creating a tight band behind the knee. Pt to monitor this to avoid the constriction behind the knee. · Communication/Consultation:  None today  · Equipment provided today:  stockinettes issued B  · Recommendations/Intent for next treatment session: Next visit will focus on ROM, strengthening, pain and edema management as indicated.     Total Treatment Billable Duration:  60 mins  PT Patient Time In/Time Out  Time In: 6683  Time Out: 35 [facility], PT    Future Appointments   Date Time Provider Tamara Brewer   3/5/2021  2:00 PM Joy Dao DPT [facility]   3/8/2021  4:15 PM Joy Dao DPT [facility]   3/10/2021  4:15 PM Joy Dao DPT [facility]   3/12/2021  4:15 PM Keo aB PTA [facility]   3/22/2021  4:15 PM Mateo Cazares Moraima RYDER, PTA [facility]   3/24/2021  2:45 PM Ghanshyam Mena, DPT [facility]   3/26/2021  3:30 PM Ghanshyam eMna, SONIAT [facility]   3/29/2021  4:15 PM Moraima Grant, PTA [facility]   3/31/2021 11:00 AM Ghanshyam Mena DPT [facility]

## 2021-03-02 NOTE — THERAPY EVALUATION
Martha Resendiz  : 1980 [address], [address] [place] [address]  [phone]   [phone]        OUTPATIENT PHYSICAL THERAPY:Initial Assessment 3/2/2021         ICD-10: Treatment Diagnosis: Pain in right knee (M25.561), Stiffness of right knee, not elsewhere classified (M25.661), Pain in left knee (M25.562) and Stiffness of left knee, not elsewhere classified (M25.662) and Muscle weakness (generalized) (M62.81) and Other abnormalities of gait and mobility (R26.89)  Precautions/Allergies:   Ciprofloxacin and Penicillins   Fall Risk Score:     Ambulatory/Rehab Services H2 Model Falls Risk Assessment    Risk Factors:       No Risk Factors Identified Ability to Rise from Chair:       (1)  Pushes up, successful in one attempt    Falls Prevention Plan:       No modifications necessary   Total: (5 or greater = High Risk): 1     [facility] [employer]. All Rights Reserved. [facility] [employer] Patent #[id number]. Federal Law prohibits the replication, distribution or use without written permission from H?REL     MD Orders: eval and treat MEDICAL/REFERRING DIAGNOSIS:  Pain in right knee [M25.561]  Pain in left knee [M25.562]   DATE OF ONSET: 21 surgery  REFERRING PHYSICIAN: Ilsa Vila MD  RETURN PHYSICIAN APPOINTMENT: 3/4/21     INITIAL ASSESSMENT:  Ms. Lisa Bhatti presents with B knee pain, stiffness, weakness, swelling, decreased balance and gait abnormalities following B meniscectomies, chondroplasty, and L ACL reconstruction. She will benefit from skilled PT to address these deficits and allow pt to resume premorbid activities without instability inc working at [employer] and walking her dogs. PROBLEM LIST (Impacting functional limitations):  1. Decreased Strength  2. Decreased ADL/Functional Activities  3. Decreased Transfer Abilities  4. Decreased Ambulation Ability/Technique  5.  Decreased Balance  6. Increased Pain  7. Decreased Flexibility/Joint Mobility  8. Decreased Knowledge of Precautions  9. Decreased Hext with Home Exercise Program INTERVENTIONS PLANNED:  1. Balance Exercise  2. Cryotherapy  3. Home Exercise Program (HEP)  4. Manual Therapy  5. Range of Motion (ROM)  6. Therapeutic Activites  7. Therapeutic Exercise/Strengthening    TREATMENT PLAN:  Effective Dates: 3/2/2021 TO 5/31/2021 (90 days). Frequency/Duration: 2-3 times a week for 90 Days  GOALS: (Goals have been discussed and agreed upon with patient.)  Short-Term Functional Goals: Time Frame: 4 weeks  1. Pt to report compliance with HEP  2. Pt to achieve 0-125 AROM B knees for normal function and mechanics  3. Pt to demonstrate normal gait level surfaces without assistive device  4. Pt to jun progressive ex program without increased pain or swelling. Discharge Goals: Time Frame: 16 weeks  1. Pt to increase strength for reciprocal gait on stairs  2. Pt to increase SLS > 20 sec B for safe amb all surfaces  3. Pt to resume working without limitation inc navigating stairs consistently  4. Pt to resume walking dogs without limitations  Rehabilitation Potential For Stated Goals: Good  Regarding Kanchan Resendiz's therapy, I certify that the treatment plan above will be carried out by a therapist or under their direction. Thank you for this referral,  Johanne Baker PT       Referring Physician Signature: Fadi Gibbons MD              Date                      HISTORY:   History of Present Injury/Illness (Reason for Referral):  Pt had an ACL reconstruction of L knee late 90's. She started having issues and knee pain with instability. She found out the lig was torn again so she had it reconstructed again with an allograft. She also had B meniscectomies with chondroplasty. She fell the first day home trying to go to the bathroom. She thinks both knees bent backwards.   She called the doctor and was told not to worry if it did not bleed more. The R leg has swollen more than the L. No hobbies except walking dogs. Past Medical History/Comorbidities:   Ms. Tor Figueroa  has a past medical history of Anxiety, Asthma, Bradycardia, Brain tumor ([address] Depression, GERD (gastroesophageal reflux disease), Hypertension, Nausea & vomiting, and Sleep apnea. Ms. Tor Figueroa  has a past surgical history that includes hx other surgical; hx cholecystectomy; hx craniotomy; hx knee arthroscopy (Left); and hx tonsillectomy. Social History/Living Environment:     pt lives in single story home with family  Prior Level of Function/Work/Activity:  Works FT at [employer] but currently OOW  Dominant Side:         RIGHT  Current Medications:    Current Outpatient Medications:     venlafaxine (EFFEXOR) 75 mg tablet, Take 25 mg by mouth nightly., Disp: , Rfl:     oxyCODONE-acetaminophen (Percocet) 5-325 mg per tablet, Take  by mouth every four (4) hours as needed for Pain., Disp: , Rfl:     meclizine (ANTIVERT) 12.5 mg tablet, Take  by mouth nightly., Disp: , Rfl:     cetirizine (ZyrTEC) 10 mg tablet, Take  by mouth., Disp: , Rfl:     ergocalciferol (Vitamin D2) 1,250 mcg (50,000 unit) capsule, Take 50,000 Units by mouth every seven (7) days. , Disp: , Rfl:     melatonin 3 mg tablet, Take  by mouth., Disp: , Rfl:     traZODone (DESYREL) 100 mg tablet, Take 100 mg by mouth nightly., Disp: , Rfl:     pravastatin (PRAVACHOL) 80 mg tablet, Take 80 mg by mouth nightly., Disp: , Rfl:     NORETHINDRONE (MICRONOR, 28, PO), Take  by mouth nightly., Disp: , Rfl:     triamcinolone acetonide (KENALOG) 0.1 % topical cream, Apply  to affected area two (2) times a day. use thin layer, Disp: , Rfl:     albuterol (PROVENTIL HFA, VENTOLIN HFA, PROAIR HFA) 90 mcg/actuation inhaler, Take  by inhalation. , Disp: , Rfl:     ranitidine (ZANTAC) 150 mg tablet, Take 150 mg by mouth daily. , Disp: , Rfl:    Date Last Reviewed:  3/2/2021   # of Personal Factors/Comorbidities that affect the Plan of Care: 1-2: MODERATE COMPLEXITY   EXAMINATION:   Observation/Orthostatic Postural Assessment:          Steri-strips intact R med and lat portals; dressing with tegaderm intact over L knee with thick black dried blood contained;  Wearing brace locked at 0 on L   Palpation:          Good patellar mobility B  ROM:         AROM  Knee flex-ext R 102-0, 83-0 L        Calf tightness B  Strength:         Good quad set R, fair quad set L;  Further strength testing deferred at today's visit  Neurological Screen:        unremarkable  Functional Mobility:         Gait/Ambulation:  Pt leans on B axillary crutches with step-to gait pattern, brace at 0        Transfers:  Pt uses UE assist and R LE         Stairs:  Step-to pattern or avoids currrently  Balance:          Unable to assess at today's visit   Body Structures Involved:  1. Bones  2. Joints  3. Muscles  4. Ligaments Body Functions Affected:  1. Sensory/Pain  2. Movement Related Activities and Participation Affected:  1. General Tasks and Demands  2. Mobility  3. Self Care  4. Domestic Life  5. Interpersonal Interactions and Relationships  6. Community, Social and [place]   # of elements that affect the Plan of Care: 3: MODERATE COMPLEXITY   CLINICAL PRESENTATION:   Presentation: Evolving clinical presentation with changing clinical characteristics: MODERATE COMPLEXITY   CLINICAL DECISION MAKING:   Tool Used: Lower Extremity Functional Scale (LEFS)  Score:  Initial: 8/80 Most Recent: X/80 (Date: -- )   Interpretation of Score: 20 questions each scored on a 5 point scale with 0 representing \"extreme difficulty or unable to perform\" and 4 representing \"no difficulty\". The lower the score, the greater the functional disability. 80/80 represents no disability. Minimal detectable change is 9 points. Medical Necessity:   · Patient demonstrates good rehab potential due to higher previous functional level.   Reason for Services/Other Comments:  · Patient continues to require present interventions due to patient's inability to function without pain and instability limiting her.    Use of outcome tool(s) and clinical judgement create a POC that gives a: Questionable prediction of patient's progress: MODERATE COMPLEXITY     Total Treatment Duration:  PT Patient Time In/Time Out  Time In: 0215  Time Out: 0315     Eliecer Viera, PT

## 2021-03-04 ENCOUNTER — APPOINTMENT (OUTPATIENT)
Dept: PHYSICAL THERAPY | Age: 41
End: 2021-03-04
Payer: COMMERCIAL

## 2021-03-05 ENCOUNTER — APPOINTMENT (OUTPATIENT)
Dept: PHYSICAL THERAPY | Age: 41
End: 2021-03-05
Payer: COMMERCIAL

## 2021-03-05 ENCOUNTER — HOSPITAL ENCOUNTER (OUTPATIENT)
Dept: PHYSICAL THERAPY | Age: 41
Discharge: HOME | End: 2021-03-05
Payer: COMMERCIAL

## 2021-03-05 PROCEDURE — 97110 THERAPEUTIC EXERCISES: CPT

## 2021-03-05 PROCEDURE — 97016 VASOPNEUMATIC DEVICE THERAPY: CPT

## 2021-03-05 NOTE — PROGRESS NOTES
Called to phi   Fidencio Resendiz  : 1980  Primary: [facility]  Secondary:  [address] @ [address], [facility] [place].  [phone]   [phone]      OUTPATIENT PHYSICAL THERAPY: Daily Treatment Note  3/5/2021   ICD-10: Treatment Diagnosis: Pain in right knee (M25.561), Stiffness of right knee, not elsewhere classified (M25.661), Pain in left knee (M25.562) and Stiffness of left knee, not elsewhere classified (M25.662) and Muscle weakness (generalized) (M62.81) and Other abnormalities of gait and mobility (R26.89)     L ACL allograft, B chondroplasty and meniscectomy 21    Effective Dates: 3/2/2021 TO 2021 (90 days). Frequency/Duration: 2-3 times a week for 90 Days  GOALS: (Goals have been discussed and agreed upon with patient.)  Short-Term Functional Goals: Time Frame: 4 weeks  1. Pt to report compliance with HEP  2. Pt to achieve 0-125 AROM B knees for normal function and mechanics  3. Pt to demonstrate normal gait level surfaces without assistive device  4. Pt to jun progressive ex program without increased pain or swelling. Discharge Goals: Time Frame: 16 weeks  1. Pt to increase strength for reciprocal gait on stairs  2. Pt to increase SLS > 20 sec B for safe amb all surfaces  3. Pt to resume working without limitation inc navigating stairs consistently  4. Pt to resume walking dogs without limitations  _______________________________________________________________________________  Pre-treatment Symptoms/Complaints:  Notes the knee is doing ok. Had follow up with MD yesterday with no concerns. Still lacks confidence with crutch ambulation.    Pain: Initial: 4/10 Post Session:  1/10   Medications Last Reviewed:  3/5/2021    Updated Objective Findings:      Observation/Orthostatic Postural Assessment:          Steri-strips intact R med and lat portals; dressing with tegaderm intact over L knee with thick black dried blood contained; Wearing brace locked at 0 on L   Palpation:          Good patellar mobility B  ROM:         AROM  Knee flex-ext R 102-0, 83-0 L        Calf tightness B  Strength:         Good quad set R, fair quad set L;  Further strength testing deferred at today's visit  Functional Mobility:         Gait/Ambulation:  Pt leans on B axillary crutches with step-to gait pattern, brace at 0        Transfers:  Pt uses UE assist and R LE         Stairs:  Step-to pattern or avoids currrently  Balance:          Unable to assess at today's visit    Tool Used: Lower Extremity Functional Scale (LEFS)  Score:  Initial: 8/80 Most Recent: X/80 (Date: -- )   Interpretation of Score: 20 questions each scored on a 5 point scale with 0 representing \"extreme difficulty or unable to perform\" and 4 representing \"no difficulty\". The lower the score, the greater the functional disability. 80/80 represents no disability. Minimal detectable change is 9 points. All objective findings are as at initial evaluation unless otherwise noted     TREATMENT:   THERAPEUTIC ACTIVITY: ( see below for minutes): Therapeutic activities per grid below to improve mobility. Required moderate verbal and manual cues to improve functional mobility . THERAPEUTIC EXERCISE: (see below for minutes):  Exercises per grid below to improve strength. Required moderate verbal and manual cues to promote proper body alignment, promote proper body posture, promote proper body mechanics and promote proper body breathing techniques. Progressed resistance, range, repetitions and complexity of movement as indicated. MANUAL THERAPY: (see below for minutes): Joint mobilization and Soft tissue mobilization was utilized and necessary because of the patient's restricted joint motion, painful spasm, loss of articular motion and restricted motion of soft tissue. MODALITIES: (see below for minutes):      for pain modulation    AQUATIC THERAPY (see below for minutes):  Aquatic treatment performed per flow grid for Decreased muscle strength, Decreased endurance, Decreased static/dynamic balance and reactive control, Decreased activity endurance, Decompression, Ease of movement and Low impact and reduced weight bearing activity. Date: 3/2/21 3/5/21 (visit 2)      Modalities: 15 mins 15 min       Game ready B knees seated Med pressure, coldest temp 15 min              Manual Therapy:                        Therapeutic Exercises: 25 mins 45 min       Pt education, postural education, HEP, functional breathing, patella mobs 15 mins       Quad set B X 10       SLR B X 10 30x to R; with NMES 10sec on/off x5 min with assist      gastroc stretch B Actively or with strap       Gait mechanics level surfaces With crutches 10 min juliana walk overs cueing quad activation      Heel slides  20x B      FTKE  40x green tband to L                                      HEP: see hand out    [employer] Portal  Treatment/Session Summary:    · Response to Treatment:  Encouraged SLR and heel slides for HEP. Demonstrates good knee flexion. Requires assistance for SLR on the LLE due to quad weakness. · Communication/Consultation:  None today  · Equipment provided today:  [employer] issued B  · Recommendations/Intent for next treatment session: Next visit will focus on ROM, strengthening, pain and edema management as indicated.     Total Treatment Billable Duration:  60 mins  PT Patient Time In/Time Out  Time In: 1400  Time Out: [address], DPT    Future Appointments   Date Time Provider Tamara Brewer   3/8/2021  4:15 PM Jose Daniel Montes, PABLO [facility]   3/10/2021  4:15 PM Jose Daniel Montes DPT [facility]   3/15/2021  3:30 PM Reese Cool [facility]   3/19/2021 11:00 AM Jose Daniel Montes DPT [facility]   3/22/2021  4:15 PM Luci Sos, PTA [facility]   3/24/2021  2:45 PM Jose Daniel Montes DPT [facility]   3/26/2021  3:30 PM Jose Daniel Montes, SONIAT [facility]   3/29/2021  4:15 PM Litzy Sapp, PTA [facility]   3/31/2021 11:00 AM PABLO [facility]

## 2021-03-08 ENCOUNTER — HOSPITAL ENCOUNTER (OUTPATIENT)
Dept: PHYSICAL THERAPY | Age: 41
Discharge: HOME | End: 2021-03-08
Payer: COMMERCIAL

## 2021-03-08 PROCEDURE — 97016 VASOPNEUMATIC DEVICE THERAPY: CPT

## 2021-03-08 PROCEDURE — 97110 THERAPEUTIC EXERCISES: CPT

## 2021-03-08 NOTE — PROGRESS NOTES
Luis Resendiz  : 1980  Primary: [facility]  Secondary:  [address] @ [address]  [address] [address].  [phone]   [phone]      OUTPATIENT PHYSICAL THERAPY: Daily Treatment Note  3/8/2021   ICD-10: Treatment Diagnosis: Pain in right knee (M25.561), Stiffness of right knee, not elsewhere classified (M25.661), Pain in left knee (M25.562) and Stiffness of left knee, not elsewhere classified (M25.662) and Muscle weakness (generalized) (M62.81) and Other abnormalities of gait and mobility (R26.89)     L ACL allograft, B chondroplasty and meniscectomy 21    Effective Dates: 3/2/2021 TO 2021 (90 days). Frequency/Duration: 2-3 times a week for 90 Days  GOALS: (Goals have been discussed and agreed upon with patient.)  Short-Term Functional Goals: Time Frame: 4 weeks  1. Pt to report compliance with HEP  2. Pt to achieve 0-125 AROM B knees for normal function and mechanics  3. Pt to demonstrate normal gait level surfaces without assistive device  4. Pt to jun progressive ex program without increased pain or swelling. Discharge Goals: Time Frame: 16 weeks  1. Pt to increase strength for reciprocal gait on stairs  2. Pt to increase SLS > 20 sec B for safe amb all surfaces  3. Pt to resume working without limitation inc navigating stairs consistently  4. Pt to resume walking dogs without limitations  _______________________________________________________________________________  Pre-treatment Symptoms/Complaints:  Continues to take pain medication every 5 hours. More comfortable with ambulation and has been going without crutches around the house.    Pain: Initial: 4/10 Post Session:  1/10   Medications Last Reviewed:  3/8/2021    Updated Objective Findings:      Observation/Orthostatic Postural Assessment:          Steri-strips intact R med and lat portals; dressing with tegaderm intact over L knee with thick black dried blood contained;  Wearing brace locked at 0 on L   Palpation:          Good patellar mobility B  ROM:         AROM  Knee flex-ext R 102-0, 83-0 L        Calf tightness B  Strength:         Good quad set R, fair quad set L;  Further strength testing deferred at today's visit  Functional Mobility:         Gait/Ambulation:  Pt leans on B axillary crutches with step-to gait pattern, brace at 0        Transfers:  Pt uses UE assist and R LE         Stairs:  Step-to pattern or avoids currrently  Balance:          Unable to assess at today's visit    Tool Used: Lower Extremity Functional Scale (LEFS)  Score:  Initial: 8/80 Most Recent: X/80 (Date: -- )   Interpretation of Score: 20 questions each scored on a 5 point scale with 0 representing \"extreme difficulty or unable to perform\" and 4 representing \"no difficulty\". The lower the score, the greater the functional disability. 80/80 represents no disability. Minimal detectable change is 9 points. All objective findings are as at initial evaluation unless otherwise noted     TREATMENT:   THERAPEUTIC ACTIVITY: ( see below for minutes): Therapeutic activities per grid below to improve mobility. Required moderate verbal and manual cues to improve functional mobility . THERAPEUTIC EXERCISE: (see below for minutes):  Exercises per grid below to improve strength. Required moderate verbal and manual cues to promote proper body alignment, promote proper body posture, promote proper body mechanics and promote proper body breathing techniques. Progressed resistance, range, repetitions and complexity of movement as indicated. MANUAL THERAPY: (see below for minutes): Joint mobilization and Soft tissue mobilization was utilized and necessary because of the patient's restricted joint motion, painful spasm, loss of articular motion and restricted motion of soft tissue. MODALITIES: (see below for minutes):      for pain modulation    AQUATIC THERAPY (see below for minutes):  Aquatic treatment performed per flow grid for Decreased muscle strength, Decreased endurance, Decreased static/dynamic balance and reactive control, Decreased activity endurance, Decompression, Ease of movement and Low impact and reduced weight bearing activity. Date: 3/2/21 3/5/21 (visit 2) 3/8/21 (visit 3)     Modalities: 15 mins 15 min  10 min      Game ready B knees seated Med pressure, coldest temp 15 min 10 min              Manual Therapy:                        Therapeutic Exercises: 25 mins 45 min  45 min      Pt education, postural education, HEP, functional breathing, patella mobs 15 mins       Quad set B X 10       SLR B X 10 30x to R; with NMES 10sec on/off x5 min with assist 30x to R; with NMES 10sec on/off x 5 min      gastroc stretch B Actively or with strap       Gait mechanics level surfaces With crutches 10 min juliana walk overs cueing quad activation 10 min progressing without crutches     Heel slides  20x B 20x B with assist on L     FTKE  40x green tband to L 40x green tband to L     Hip abduction   20x B     LAQ   90-0 ° R, 90-30 ° L 30x total                     HEP: see hand out    [employer] Portal  Treatment/Session Summary:    · Response to Treatment:  Patient is progressing with independence on ambulation with decreasing use of crutches. · Communication/Consultation:  None today  · Equipment provided today:  [employer] issued B  · Recommendations/Intent for next treatment session: Next visit will focus on ROM, strengthening, pain and edema management as indicated.     Total Treatment Billable Duration:  55 mins  PT Patient Time In/Time Out  Time In: 1725  Time Out: [address], DPT    Future Appointments   Date Time Provider Tamara Brewer   3/10/2021  4:15 PM Ekaterina Figueredo DPT [facility]   3/15/2021  3:30 PM Eloy Burroughs [facility]   3/19/2021 11:00 AM Ekaterina Figueredo DPT [facility]   3/22/2021  4:15 PM Adam Jade PTA [facility] 3/24/2021  2:45 PM Joy Dao DPT [facility]   3/26/2021  3:30 PM Joy Dao DPT [facility]   3/29/2021  4:15 PM Keo Ba PTA [facility]   3/31/2021 11:00 AM Joy Dao DPT [facility]

## 2021-03-09 ENCOUNTER — APPOINTMENT (OUTPATIENT)
Dept: PHYSICAL THERAPY | Age: 41
End: 2021-03-09
Payer: COMMERCIAL

## 2021-03-10 ENCOUNTER — HOSPITAL ENCOUNTER (OUTPATIENT)
Dept: PHYSICAL THERAPY | Age: 41
Discharge: HOME | End: 2021-03-10
Payer: COMMERCIAL

## 2021-03-10 PROCEDURE — 97110 THERAPEUTIC EXERCISES: CPT

## 2021-03-10 PROCEDURE — 97016 VASOPNEUMATIC DEVICE THERAPY: CPT

## 2021-03-10 NOTE — PROGRESS NOTES
Jhonathan Resendiz  : 1980  Primary: [facility]  Secondary:  [address] [facility] @ [address], [facility] [place].  [phone]   [phone]      OUTPATIENT PHYSICAL THERAPY: Daily Treatment Note  3/10/2021   ICD-10: Treatment Diagnosis: Pain in right knee (M25.561), Stiffness of right knee, not elsewhere classified (M25.661), Pain in left knee (M25.562) and Stiffness of left knee, not elsewhere classified (M25.662) and Muscle weakness (generalized) (M62.81) and Other abnormalities of gait and mobility (R26.89)     L ACL allograft, B chondroplasty and meniscectomy 21    Effective Dates: 3/2/2021 TO 2021 (90 days). Frequency/Duration: 2-3 times a week for 90 Days  GOALS: (Goals have been discussed and agreed upon with patient.)  Short-Term Functional Goals: Time Frame: 4 weeks  1. Pt to report compliance with HEP  2. Pt to achieve 0-125 AROM B knees for normal function and mechanics  3. Pt to demonstrate normal gait level surfaces without assistive device  4. Pt to jun progressive ex program without increased pain or swelling. Discharge Goals: Time Frame: 16 weeks  1. Pt to increase strength for reciprocal gait on stairs  2. Pt to increase SLS > 20 sec B for safe amb all surfaces  3. Pt to resume working without limitation inc navigating stairs consistently  4. Pt to resume walking dogs without limitations  _______________________________________________________________________________  Pre-treatment Symptoms/Complaints:  Has been experiencing some episodes of sharp pain at the patella, but this has only been brief.     Pain: Initial: 4/10 Post Session:  1/10   Medications Last Reviewed:  3/10/2021    Updated Objective Findings:      Observation/Orthostatic Postural Assessment:          Steri-strips intact R med and lat portals; dressing with tegaderm intact over L knee with thick black dried blood contained;  Wearing brace locked at 0 on L   Palpation:          Good patellar mobility B  ROM:         AROM  Knee flex-ext R 102-0, 83-0 L        Calf tightness B  Strength:         Good quad set R, fair quad set L;  Further strength testing deferred at today's visit  Functional Mobility:         Gait/Ambulation:  Pt leans on B axillary crutches with step-to gait pattern, brace at 0        Transfers:  Pt uses UE assist and R LE         Stairs:  Step-to pattern or avoids currrently  Balance:          Unable to assess at today's visit    Tool Used: Lower Extremity Functional Scale (LEFS)  Score:  Initial: 8/80 Most Recent: X/80 (Date: -- )   Interpretation of Score: 20 questions each scored on a 5 point scale with 0 representing \"extreme difficulty or unable to perform\" and 4 representing \"no difficulty\". The lower the score, the greater the functional disability. 80/80 represents no disability. Minimal detectable change is 9 points. All objective findings are as at initial evaluation unless otherwise noted     TREATMENT:   THERAPEUTIC ACTIVITY: ( see below for minutes): Therapeutic activities per grid below to improve mobility. Required moderate verbal and manual cues to improve functional mobility . THERAPEUTIC EXERCISE: (see below for minutes):  Exercises per grid below to improve strength. Required moderate verbal and manual cues to promote proper body alignment, promote proper body posture, promote proper body mechanics and promote proper body breathing techniques. Progressed resistance, range, repetitions and complexity of movement as indicated. MANUAL THERAPY: (see below for minutes): Joint mobilization and Soft tissue mobilization was utilized and necessary because of the patient's restricted joint motion, painful spasm, loss of articular motion and restricted motion of soft tissue. MODALITIES: (see below for minutes):      for pain modulation    AQUATIC THERAPY (see below for minutes):  Aquatic treatment performed per flow grid for Decreased muscle strength, Decreased endurance, Decreased static/dynamic balance and reactive control, Decreased activity endurance, Decompression, Ease of movement and Low impact and reduced weight bearing activity. Date: 3/2/21 3/5/21 (visit 2) 3/8/21 (visit 3) 3/10/21 (visit4)    Modalities: 15 mins 15 min  10 min  10 min     Game ready B knees seated Med pressure, coldest temp 15 min 10 min  10 min             Manual Therapy:                        Therapeutic Exercises: 25 mins 45 min  45 min  45 min    Pt education, postural education, HEP, functional breathing, patella mobs 15 mins       Quad set B X 10       SLR B X 10 30x to R; with NMES 10sec on/off x5 min with assist 30x to R; with NMES 10sec on/off x 5 min  With NMES 10sec on/offf x 5 min     gastroc stretch B Actively or with strap       Gait mechanics level surfaces With crutches 10 min juliana walk overs cueing quad activation 10 min progressing without crutches     Heel slides  20x B 20x B with assist on L 20x B with therapist assist on L    FTKE  40x green tband to L 40x green tband to L 40x green tband to L    Hip abduction   20x B 20x B    LAQ   90-0 ° R, 90-30 ° L 30x total 90-0 ° R; 90-30 ° L 30x each                    HEP: see hand out    [employer] Portal  Treatment/Session Summary:    · Response to Treatment:  Demonstrates improved control of extension lag with SLR. Needs further quad strength to improve stability for progressing off crutches and unlocking brace. · Communication/Consultation:  None today  · Equipment provided today:  shay issued B  · Recommendations/Intent for next treatment session: Next visit will focus on ROM, strengthening, pain and edema management as indicated.     Total Treatment Billable Duration:  55 mins  PT Patient Time In/Time Out  Time In: 1615  Time Out: [address] [facility]    Future Appointments   Date Time Provider Tamara Brewer   3/15/2021  3:30 PM Reese Roy [facility] 3/19/2021 11:00 AM Italo Grant, DPT [facility]   3/22/2021  4:15 PM Octavio Marie, PTA [facility]   3/24/2021  2:45 PM Italo Grant, DPT [facility]   3/26/2021  3:30 PM Italo Grant, DPT [facility]   3/29/2021  4:15 PM Octavio Marie, PTA [facility]   3/31/2021 11:00 AM Italo Grant, DPT [facility]

## 2021-03-11 ENCOUNTER — APPOINTMENT (OUTPATIENT)
Dept: PHYSICAL THERAPY | Age: 41
End: 2021-03-11
Payer: COMMERCIAL

## 2021-03-12 ENCOUNTER — APPOINTMENT (OUTPATIENT)
Dept: PHYSICAL THERAPY | Age: 41
End: 2021-03-12
Payer: COMMERCIAL

## 2021-03-15 ENCOUNTER — HOSPITAL ENCOUNTER (OUTPATIENT)
Dept: PHYSICAL THERAPY | Age: 41
Discharge: HOME | End: 2021-03-15
Payer: COMMERCIAL

## 2021-03-15 PROCEDURE — 97110 THERAPEUTIC EXERCISES: CPT

## 2021-03-15 PROCEDURE — 97016 VASOPNEUMATIC DEVICE THERAPY: CPT

## 2021-03-15 NOTE — PROGRESS NOTES
Lynae Spatz Benton  : 1980  Primary: [facility]  Secondary:  [address] [place] @ [address], [place].  [phone]   [phone]      OUTPATIENT PHYSICAL THERAPY: Daily Treatment Note  3/15/2021   ICD-10: Treatment Diagnosis: Pain in right knee (M25.561), Stiffness of right knee, not elsewhere classified (M25.661), Pain in left knee (M25.562) and Stiffness of left knee, not elsewhere classified (M25.662) and Muscle weakness (generalized) (M62.81) and Other abnormalities of gait and mobility (R26.89)     L ACL allograft, B chondroplasty and meniscectomy 21    Effective Dates: 3/2/2021 TO 2021 (90 days). Frequency/Duration: 2-3 times a week for 90 Days  GOALS: (Goals have been discussed and agreed upon with patient.)  Short-Term Functional Goals: Time Frame: 4 weeks  1. Pt to report compliance with HEP  2. Pt to achieve 0-125 AROM B knees for normal function and mechanics  3. Pt to demonstrate normal gait level surfaces without assistive device  4. Pt to jun progressive ex program without increased pain or swelling. Discharge Goals: Time Frame: 16 weeks  1. Pt to increase strength for reciprocal gait on stairs  2. Pt to increase SLS > 20 sec B for safe amb all surfaces  3. Pt to resume working without limitation inc navigating stairs consistently  4. Pt to resume walking dogs without limitations  _______________________________________________________________________________  Pre-treatment Symptoms/Complaints:  Pt reports she has been working on walking in the house with no crutch.    Pain: Initial: 3-4/10 Post Session:  Pt reported her knees felt better    Medications Last Reviewed:  3/15/2021    Updated Objective Findings:      Observation/Orthostatic Postural Assessment:          Steri-strips intact R med and lat portals; dressing with tegaderm intact over L knee with thick black dried blood contained;  Wearing brace locked at 0 on L   Palpation:          Good patellar mobility B  ROM:         AROM  Knee flex-ext R 102-0, 83-0 L        Calf tightness B  Strength:         Good quad set R, fair quad set L;  Further strength testing deferred at today's visit  Functional Mobility:         Gait/Ambulation:  Pt leans on B axillary crutches with step-to gait pattern, brace at 0        Transfers:  Pt uses UE assist and R LE         Stairs:  Step-to pattern or avoids currrently  Balance:          Unable to assess at today's visit    Tool Used: Lower Extremity Functional Scale (LEFS)  Score:  Initial: 8/80 Most Recent: X/80 (Date: -- )   Interpretation of Score: 20 questions each scored on a 5 point scale with 0 representing \"extreme difficulty or unable to perform\" and 4 representing \"no difficulty\". The lower the score, the greater the functional disability. 80/80 represents no disability. Minimal detectable change is 9 points. All objective findings are as at initial evaluation unless otherwise noted     TREATMENT:   THERAPEUTIC ACTIVITY: ( see below for minutes): Therapeutic activities per grid below to improve mobility. Required moderate verbal and manual cues to improve functional mobility . THERAPEUTIC EXERCISE: (see below for minutes):  Exercises per grid below to improve strength. Required moderate verbal and manual cues to promote proper body alignment, promote proper body posture, promote proper body mechanics and promote proper body breathing techniques. Progressed resistance, range, repetitions and complexity of movement as indicated. MANUAL THERAPY: (see below for minutes): Joint mobilization and Soft tissue mobilization was utilized and necessary because of the patient's restricted joint motion, painful spasm, loss of articular motion and restricted motion of soft tissue. MODALITIES: (see below for minutes):      for pain modulation    AQUATIC THERAPY (see below for minutes):  Aquatic treatment performed per flow grid for Decreased muscle strength, Decreased endurance, Decreased static/dynamic balance and reactive control, Decreased activity endurance, Decompression, Ease of movement and Low impact and reduced weight bearing activity. Date: 3/2/21 3/5/21 (visit 2) 3/8/21 (visit 3) 3/10/21 (visit4) 3/15/21 (visit 5)    Modalities: 15 mins 15 min  10 min  10 min  15 min   Game ready B knees seated Med pressure, coldest temp 15 min 10 min  10 min  15'           Manual Therapy:                        Therapeutic Exercises: 25 mins 45 min  45 min  45 min 45 min   Pt education, postural education, HEP, functional breathing, patella mobs 15 mins       Quad set B X 10       SLR B X 10 30x to R; with NMES 10sec on/off x5 min with assist 30x to R; with NMES 10sec on/off x 5 min  With NMES 10sec on/offf x 5 min  With NMES, 10 sec on/off for 5 min   gastroc stretch B Actively or with strap       Gait mechanics level surfaces With crutches 10 min juliana walk overs cueing quad activation 10 min progressing without crutches  Weight shifts: x10    Heel slides  20x B 20x B with assist on L 20x B with therapist assist on L x20 B with strap on L   FTKE  40x green tband to L 40x green tband to L 40x green tband to L 3x10 green B   Hip abduction   20x B 20x B x20 B    LAQ   90-0 ° R, 90-30 ° L 30x total 90-0 ° R; 90-30 ° L 30x each x30 each  90-0 R, 90-30 L   Bio step     5' L1           HEP: see hand out    [employer] Portal  Treatment/Session Summary:    · Response to Treatment:  Pt tolerated new exercises well. She was able to do SLR without e-stim but had slight quad lag. With e-stim applied, she demonstrated no quad lag. · Communication/Consultation:  None today  · Equipment provided today:  shay issued B  · Recommendations/Intent for next treatment session: Next visit will focus on ROM, strengthening, pain and edema management as indicated.     Total Treatment Billable Duration:  60 mins  PT Patient Time In/Time Out  Time In: 1530  Time Out: [address], [place], DPT    Future Appointments   Date Time Provider Tamara Daniella   3/19/2021 11:00 AM Sariah Delgado, DPT [facility]   3/22/2021  4:15 PM Martin Patterson, PTA [facility]   3/24/2021  2:45 PM Sariah Delgado, DPT [facility]   3/26/2021  3:30 PM Sariah Delgado, DPT [facility]   3/29/2021  4:15 PM Martin Patterson, PTA [facility]   3/31/2021 11:00 AM Sariah Delgado, DPT [facility]   4/8/2021  2:00 PM SIMEON Patrick

## 2021-03-16 ENCOUNTER — APPOINTMENT (OUTPATIENT)
Dept: PHYSICAL THERAPY | Age: 41
End: 2021-03-16
Payer: COMMERCIAL

## 2021-03-18 ENCOUNTER — APPOINTMENT (OUTPATIENT)
Dept: PHYSICAL THERAPY | Age: 41
End: 2021-03-18
Payer: COMMERCIAL

## 2021-03-19 ENCOUNTER — APPOINTMENT (OUTPATIENT)
Dept: PHYSICAL THERAPY | Age: 41
End: 2021-03-19
Payer: COMMERCIAL

## 2021-03-19 ENCOUNTER — HOSPITAL ENCOUNTER (OUTPATIENT)
Dept: PHYSICAL THERAPY | Age: 41
Discharge: HOME | End: 2021-03-19
Payer: COMMERCIAL

## 2021-03-19 PROCEDURE — 97016 VASOPNEUMATIC DEVICE THERAPY: CPT

## 2021-03-19 PROCEDURE — 97110 THERAPEUTIC EXERCISES: CPT

## 2021-03-19 NOTE — PROGRESS NOTES
Heron Resendiz  : 1980  Primary: [facility]  Secondary:  [address] @ [address]  [address] [place] [address].  [phone]   [phone]      OUTPATIENT PHYSICAL THERAPY: Daily Treatment Note  3/19/2021   ICD-10: Treatment Diagnosis: Pain in right knee (M25.561), Stiffness of right knee, not elsewhere classified (M25.661), Pain in left knee (M25.562) and Stiffness of left knee, not elsewhere classified (M25.662) and Muscle weakness (generalized) (M62.81) and Other abnormalities of gait and mobility (R26.89)     L ACL allograft, B chondroplasty and meniscectomy 21    Effective Dates: 3/2/2021 TO 2021 (90 days). Frequency/Duration: 2-3 times a week for 90 Days  GOALS: (Goals have been discussed and agreed upon with patient.)  Short-Term Functional Goals: Time Frame: 4 weeks  1. Pt to report compliance with HEP  2. Pt to achieve 0-125 AROM B knees for normal function and mechanics  3. Pt to demonstrate normal gait level surfaces without assistive device  4. Pt to jun progressive ex program without increased pain or swelling. Discharge Goals: Time Frame: 16 weeks  1. Pt to increase strength for reciprocal gait on stairs  2. Pt to increase SLS > 20 sec B for safe amb all surfaces  3. Pt to resume working without limitation inc navigating stairs consistently  4. Pt to resume walking dogs without limitations  _______________________________________________________________________________  Pre-treatment Symptoms/Complaints:  Pt notes she is feeling increasingly stable with walking and standing.    Pain: Initial: 3-4/10 Post Session:  Pt reported her knees felt better    Medications Last Reviewed:  3/19/2021    Updated Objective Findings:      Observation/Orthostatic Postural Assessment:          Steri-strips intact R med and lat portals; dressing with tegaderm intact over L knee with thick black dried blood contained;  Wearing brace locked at 0 on L   Palpation:          Good patellar mobility B  ROM:         AROM  Knee flex-ext R 102-0, 83-0 L        Calf tightness B  Strength:         Good quad set R, fair quad set L;  Further strength testing deferred at today's visit  Functional Mobility:         Gait/Ambulation:  Pt leans on B axillary crutches with step-to gait pattern, brace at 0        Transfers:  Pt uses UE assist and R LE         Stairs:  Step-to pattern or avoids currrently  Balance:          Unable to assess at today's visit    Tool Used: Lower Extremity Functional Scale (LEFS)  Score:  Initial: 8/80 Most Recent: X/80 (Date: -- )   Interpretation of Score: 20 questions each scored on a 5 point scale with 0 representing \"extreme difficulty or unable to perform\" and 4 representing \"no difficulty\". The lower the score, the greater the functional disability. 80/80 represents no disability. Minimal detectable change is 9 points. All objective findings are as at initial evaluation unless otherwise noted     TREATMENT:   THERAPEUTIC ACTIVITY: ( see below for minutes): Therapeutic activities per grid below to improve mobility. Required moderate verbal and manual cues to improve functional mobility . THERAPEUTIC EXERCISE: (see below for minutes):  Exercises per grid below to improve strength. Required moderate verbal and manual cues to promote proper body alignment, promote proper body posture, promote proper body mechanics and promote proper body breathing techniques. Progressed resistance, range, repetitions and complexity of movement as indicated. MANUAL THERAPY: (see below for minutes): Joint mobilization and Soft tissue mobilization was utilized and necessary because of the patient's restricted joint motion, painful spasm, loss of articular motion and restricted motion of soft tissue. MODALITIES: (see below for minutes):      for pain modulation    AQUATIC THERAPY (see below for minutes):  Aquatic treatment performed per flow grid for Decreased muscle strength, Decreased endurance, Decreased static/dynamic balance and reactive control, Decreased activity endurance, Decompression, Ease of movement and Low impact and reduced weight bearing activity. Date: 3/2/21 3/5/21 (visit 2) 3/8/21 (visit 3) 3/10/21 (visit4) 3/15/21 (visit 5)  3/19/21 (visit 6)    Modalities: 15 mins 15 min  10 min  10 min  15 min 15 min     Game ready B knees seated Med pressure, coldest temp 15 min 10 min  10 min  15' 15 min               Manual Therapy:                              Therapeutic Exercises: 25 mins 45 min  45 min  45 min 45 min 45 min     Pt education, postural education, HEP, functional breathing, patella mobs 15 mins         Quad set B X 10         SLR B X 10 30x to R; with NMES 10sec on/off x5 min with assist 30x to R; with NMES 10sec on/off x 5 min  With NMES 10sec on/offf x 5 min  With NMES, 10 sec on/off for 5 min 5 min with NMES 10sec on/off; SLR R 30x    gastroc stretch B Actively or with strap         Gait mechanics level surfaces With crutches 10 min juliana walk overs cueing quad activation 10 min progressing without crutches  Weight shifts: x10  Walking march in II bars x 4 laps    Heel slides  20x B 20x B with assist on L 20x B with therapist assist on L x20 B with strap on L 20x with strap B; therapist assisted overpressure on L     FTKE  40x green tband to L 40x green tband to L 40x green tband to L 3x10 green B     Hip abduction   20x B 20x B x20 B  Lateral band walk green x3 laps in II bars    LAQ   90-0 ° R, 90-30 ° L 30x total 90-0 ° R; 90-30 ° L 30x each x30 each  90-0 R, 90-30 L     Bio step     5' L1 5 min L1    Step ups      4\" 10x B; 6\" 2x10 B                                  HEP: see hand out    [employer] Portal  Treatment/Session Summary:    · Response to Treatment:  Progressed to step ups and training gait mechanics to improve knee flexion through the gait cycle.    · Communication/Consultation:  None today  · Equipment provided today:  shay issued B  · Recommendations/Intent for next treatment session: Next visit will focus on ROM, strengthening, pain and edema management as indicated.     Total Treatment Billable Duration:  60 mins  PT Patient Time In/Time Out  Time In: 1100  Time Out: [address], DPT, PT, DPT    Future Appointments   Date Time Provider Tamara Daniella   3/22/2021  4:15 PM Blanchie Payment, [facility]   3/24/2021  2:45 PM SONIA LoganT [facility]   3/26/2021  3:30 PM [facility]   3/29/2021  4:15 PM Blanchie Payment, PTA [facility]   3/31/2021 11:00 AM [facility]   4/5/2021 11:00 AM Vasquez Hess, SONIAT [facility]   4/7/2021 11:00 AM SONIA LoganT [facility]   4/8/2021  2:00 PM SIMEON Valdez [facility]   4/12/2021 11:00 AM Vasquez Hess, PABLO [facility]   4/14/2021 11:00 AM Vasquez Hess, SONIAT [facility]   4/19/2021 11:00 AM Blanchie Payment, PTA [facility]   4/21/2021 11:00 AM Blanchie Payment, [facility]   4/26/2021 11:00 AM Vasquez Hess, SONIAT [facility]   4/28/2021 11:00 AM Vasquez Hess, DPT [facility]

## 2021-03-22 ENCOUNTER — HOSPITAL ENCOUNTER (OUTPATIENT)
Dept: PHYSICAL THERAPY | Age: 41
Discharge: HOME | End: 2021-03-22
Payer: COMMERCIAL

## 2021-03-22 PROCEDURE — 97110 THERAPEUTIC EXERCISES: CPT

## 2021-03-22 PROCEDURE — 97016 VASOPNEUMATIC DEVICE THERAPY: CPT

## 2021-03-22 NOTE — PROGRESS NOTES
Lynae Spatz Benton  : 1980  Primary: [facility]  Secondary:  [address] @ [address], [facility] [place].  [phone]   [phone]      OUTPATIENT PHYSICAL THERAPY: Daily Treatment Note  3/22/2021   ICD-10: Treatment Diagnosis: Pain in right knee (M25.561), Stiffness of right knee, not elsewhere classified (M25.661), Pain in left knee (M25.562) and Stiffness of left knee, not elsewhere classified (M25.662) and Muscle weakness (generalized) (M62.81) and Other abnormalities of gait and mobility (R26.89)     L ACL allograft, B chondroplasty and meniscectomy 21    Effective Dates: 3/2/2021 TO 2021 (90 days). Frequency/Duration: 2-3 times a week for 90 Days  GOALS: (Goals have been discussed and agreed upon with patient.)  Short-Term Functional Goals: Time Frame: 4 weeks  1. Pt to report compliance with HEP  2. Pt to achieve 0-125 AROM B knees for normal function and mechanics  3. Pt to demonstrate normal gait level surfaces without assistive device  4. Pt to jun progressive ex program without increased pain or swelling. Discharge Goals: Time Frame: 16 weeks  1. Pt to increase strength for reciprocal gait on stairs  2. Pt to increase SLS > 20 sec B for safe amb all surfaces  3. Pt to resume working without limitation inc navigating stairs consistently  4. Pt to resume walking dogs without limitations  _______________________________________________________________________________  Pre-treatment Symptoms/Complaints:  Pt states \"I'm having a lot of soreness in the calves. \"  Pain: Initial: 3-4/10 B LEs Post Session: no increase    Medications Last Reviewed:  3/22/2021    Updated Objective Findings:      Observation/Orthostatic Postural Assessment:          Steri-strips intact R med and lat portals; dressing with tegaderm intact over L knee with thick black dried blood contained;  Wearing brace locked at 0 on L   Palpation: Good patellar mobility B  ROM:         AROM  Knee flex-ext R 102-0, 83-0 L        Calf tightness B  Strength:         Good quad set R, fair quad set L;  Further strength testing deferred at today's visit  Functional Mobility:         Gait/Ambulation:  Pt leans on B axillary crutches with step-to gait pattern, brace at 0        Transfers:  Pt uses UE assist and R LE         Stairs:  Step-to pattern or avoids currrently  Balance:          Unable to assess at today's visit    Tool Used: Lower Extremity Functional Scale (LEFS)  Score:  Initial: 8/80 Most Recent: X/80 (Date: -- )   Interpretation of Score: 20 questions each scored on a 5 point scale with 0 representing \"extreme difficulty or unable to perform\" and 4 representing \"no difficulty\". The lower the score, the greater the functional disability. 80/80 represents no disability. Minimal detectable change is 9 points. UPDATE: 3/22/21  L AROM 0-100 °  R AROM 0-115 °         TREATMENT:   THERAPEUTIC ACTIVITY: ( see below for minutes): Therapeutic activities per grid below to improve mobility. Required moderate verbal and manual cues to improve functional mobility . THERAPEUTIC EXERCISE: (see below for minutes):  Exercises per grid below to improve strength. Required moderate verbal and manual cues to promote proper body alignment, promote proper body posture, promote proper body mechanics and promote proper body breathing techniques. Progressed resistance, range, repetitions and complexity of movement as indicated. MANUAL THERAPY: (see below for minutes): Joint mobilization and Soft tissue mobilization was utilized and necessary because of the patient's restricted joint motion, painful spasm, loss of articular motion and restricted motion of soft tissue. MODALITIES: (see below for minutes):      for pain modulation    AQUATIC THERAPY (see below for minutes):  Aquatic treatment performed per flow grid for Decreased muscle strength, Decreased endurance, Decreased static/dynamic balance and reactive control, Decreased activity endurance, Decompression, Ease of movement and Low impact and reduced weight bearing activity. Date: 3/2/21 3/5/21 (visit 2) 3/8/21 (visit 3) 3/10/21 (visit4) 3/15/21 (visit 5)  3/19/21 (visit 6) 3/22/21 (visit 7)   Modalities: 15 mins 15 min  10 min  10 min  15 min 15 min  15 min   Game ready B knees seated Med pressure, coldest temp 15 min 10 min  10 min  15' 15 min  At medium compression             Manual Therapy:                              Therapeutic Exercises: 25 mins 45 min  45 min  45 min 45 min 45 min  35 min   Pt education, postural education, HEP, functional breathing, patella mobs 15 mins      Pt was educated on the signs and symptoms of blood clots/ DVT   Quad set B X 10         SLR B X 10 30x to R; with NMES 10sec on/off x5 min with assist 30x to R; with NMES 10sec on/off x 5 min  With NMES 10sec on/offf x 5 min  With NMES, 10 sec on/off for 5 min 5 min with NMES 10sec on/off; SLR R 30x 5 min with NMES to L LE, 10 sec on/off.  3x10 R LE   gastroc stretch B Actively or with strap         Gait mechanics level surfaces With crutches 10 min juliana walk overs cueing quad activation 10 min progressing without crutches  Weight shifts: x10  Walking march in II bars x 4 laps    Heel slides  20x B 20x B with assist on L 20x B with therapist assist on L x20 B with strap on L 20x with strap B; therapist assisted overpressure on L  3x10 B with strap   FTKE  40x green tband to L 40x green tband to L 40x green tband to L 3x10 green B     Hip abduction   20x B 20x B x20 B  Lateral band walk green x3 laps in II bars    LAQ   90-0 ° R, 90-30 ° L 30x total 90-0 ° R; 90-30 ° L 30x each x30 each  90-0 R, 90-30 L     Bio step     5' L1 5 min L1 6 min L1   Step ups      4\" 10x B; 6\" 2x10 B 2x10B on 6 in step with UE support                                 HEP: see hand out    [employer] Portal  Treatment/Session Summary:    · Response to Treatment: Pt is making good progress with AROM and she did not have an extensor lag during B SLRs. She reports increased bilateral calf pain and soreness that is worse in the morning. She did not have increased edema or redness in the B LEs but she was educated on the signs and symptoms of a DVT. Continue POC. .  · Communication/Consultation:  None today  · Equipment provided today:  Hand out on blood clot signs and symptoms  · Recommendations/Intent for next treatment session: Next visit will focus on ROM, strengthening, pain and edema management as indicated.     Total Treatment Billable Duration:  50 mins  PT Patient Time In/Time Out  Time In: 8348  Time Out: [address], PTA    Future Appointments   Date Time Provider Tamara Brewer   3/24/2021  2:45 PM Leticia Zuñiga DPT [facility]   3/26/2021  3:30 PM Leticia Zuñiga DPT [facility]   3/29/2021  4:15 PM Jaylon Correa PTA [facility]   3/31/2021 11:00 AM Leticia Zuñiga DPT [facility]   4/5/2021 11:00 AM Leticia Zuñiga DPT [facility]   4/7/2021 11:00 AM Leticia Zuñiga DPT [facility]   4/8/2021  2:00 PM SIMEON Terry   4/12/2021 11:00 AM Leticia Zuñiga DPT [facility]   4/14/2021 11:00 AM Leticia Zuñiga DPT [facility]   4/19/2021 11:00 AM Jaylon Correa PTA [facility]   4/21/2021 11:00 AM Jaylon Correa PTA [facility]   4/26/2021 11:00 AM Leticia Zuñiga DPT [facility]   4/28/2021 11:00 AM Leticia Zuñiga DPT [facility]

## 2021-03-23 ENCOUNTER — APPOINTMENT (OUTPATIENT)
Dept: PHYSICAL THERAPY | Age: 41
End: 2021-03-23
Payer: COMMERCIAL

## 2021-03-24 ENCOUNTER — HOSPITAL ENCOUNTER (OUTPATIENT)
Dept: PHYSICAL THERAPY | Age: 41
Discharge: HOME | End: 2021-03-24
Payer: COMMERCIAL

## 2021-03-24 PROCEDURE — 97110 THERAPEUTIC EXERCISES: CPT

## 2021-03-24 PROCEDURE — 97016 VASOPNEUMATIC DEVICE THERAPY: CPT

## 2021-03-24 NOTE — PROGRESS NOTES
Carol Resendiz  : 1980  Primary: [facility]  Secondary:  Josef Bell [facility] [address] [place] [place]  [phone]   [phone]      OUTPATIENT PHYSICAL THERAPY: Daily Treatment Note  3/24/2021   ICD-10: Treatment Diagnosis: Pain in right knee (M25.561), Stiffness of right knee, not elsewhere classified (M25.661), Pain in left knee (M25.562) and Stiffness of left knee, not elsewhere classified (M25.662) and Muscle weakness (generalized) (M62.81) and Other abnormalities of gait and mobility (R26.89)     L ACL allograft, B chondroplasty and meniscectomy 21    Effective Dates: 3/2/2021 TO 2021 (90 days). Frequency/Duration: 2-3 times a week for 90 Days  GOALS: (Goals have been discussed and agreed upon with patient.)  Short-Term Functional Goals: Time Frame: 4 weeks  1. Pt to report compliance with HEP  2. Pt to achieve 0-125 AROM B knees for normal function and mechanics  3. Pt to demonstrate normal gait level surfaces without assistive device  4. Pt to jun progressive ex program without increased pain or swelling. Discharge Goals: Time Frame: 16 weeks  1. Pt to increase strength for reciprocal gait on stairs  2. Pt to increase SLS > 20 sec B for safe amb all surfaces  3. Pt to resume working without limitation inc navigating stairs consistently  4. Pt to resume walking dogs without limitations  _______________________________________________________________________________  Pre-treatment Symptoms/Complaints:  Continues to report improvement through the knee.    Pain: Initial: 3-4/10 B LEs Post Session: no increase    Medications Last Reviewed:  3/24/2021    Updated Objective Findings:      Observation/Orthostatic Postural Assessment:          Steri-strips intact R med and lat portals; dressing with tegaderm intact over L knee with thick black dried blood contained;  Wearing brace locked at 0 on L   Palpation:          Good patellar mobility B  ROM:         AROM  Knee flex-ext R 102-0, 83-0 L        Calf tightness B  Strength:         Good quad set R, fair quad set L;  Further strength testing deferred at today's visit  Functional Mobility:         Gait/Ambulation:  Pt leans on B axillary crutches with step-to gait pattern, brace at 0        Transfers:  Pt uses UE assist and R LE         Stairs:  Step-to pattern or avoids currrently  Balance:          Unable to assess at today's visit    Tool Used: Lower Extremity Functional Scale (LEFS)  Score:  Initial: 8/80 Most Recent: X/80 (Date: -- )   Interpretation of Score: 20 questions each scored on a 5 point scale with 0 representing \"extreme difficulty or unable to perform\" and 4 representing \"no difficulty\". The lower the score, the greater the functional disability. 80/80 represents no disability. Minimal detectable change is 9 points. UPDATE: 3/22/21  L AROM 0-100 °  R AROM 0-115 °         TREATMENT:   THERAPEUTIC ACTIVITY: ( see below for minutes): Therapeutic activities per grid below to improve mobility. Required moderate verbal and manual cues to improve functional mobility . THERAPEUTIC EXERCISE: (see below for minutes):  Exercises per grid below to improve strength. Required moderate verbal and manual cues to promote proper body alignment, promote proper body posture, promote proper body mechanics and promote proper body breathing techniques. Progressed resistance, range, repetitions and complexity of movement as indicated. MANUAL THERAPY: (see below for minutes): Joint mobilization and Soft tissue mobilization was utilized and necessary because of the patient's restricted joint motion, painful spasm, loss of articular motion and restricted motion of soft tissue. MODALITIES: (see below for minutes):      for pain modulation    AQUATIC THERAPY (see below for minutes):  Aquatic treatment performed per flow grid for Decreased muscle strength, Decreased endurance, Decreased static/dynamic balance and reactive control, Decreased activity endurance, Decompression, Ease of movement and Low impact and reduced weight bearing activity. Date: 3/2/21 3/5/21 (visit 2) 3/8/21 (visit 3) 3/10/21 (visit4) 3/15/21 (visit 5)  3/19/21 (visit 6) 3/22/21 (visit 7) 3/24/21 (visit 8)     Modalities: 15 mins 15 min  10 min  10 min  15 min 15 min  15 min 15 min      Game ready B knees seated Med pressure, coldest temp 15 min 10 min  10 min  15' 15 min  At medium compression 15 min                   Manual Therapy:                                       Therapeutic Exercises: 25 mins 45 min  45 min  45 min 45 min 45 min  35 min 45 min      Pt education, postural education, HEP, functional breathing, patella mobs 15 mins      Pt was educated on the signs and symptoms of blood clots/ DVT      Quad set B X 10            SLR B X 10 30x to R; with NMES 10sec on/off x5 min with assist 30x to R; with NMES 10sec on/off x 5 min  With NMES 10sec on/offf x 5 min  With NMES, 10 sec on/off for 5 min 5 min with NMES 10sec on/off; SLR R 30x 5 min with NMES to L LE, 10 sec on/off.  3x10 R LE SLR 30x B     gastroc stretch B Actively or with strap            Gait mechanics level surfaces With crutches 10 min juliana walk overs cueing quad activation 10 min progressing without crutches  Weight shifts: x10  Walking march in II bars x 4 laps  Walking march through floor x 2 laps     Heel slides  20x B 20x B with assist on L 20x B with therapist assist on L x20 B with strap on L 20x with strap B; therapist assisted overpressure on L  3x10 B with strap Therapist assisted stretching into knee flexion     FTKE  40x green tband to L 40x green tband to L 40x green tband to L 3x10 green B        Hip abduction   20x B 20x B x20 B  Lateral band walk green x3 laps in II bars  Lateral band walk green x 3 laps     LAQ   90-0 ° R, 90-30 ° L 30x total 90-0 ° R; 90-30 ° L 30x each x30 each  90-0 R, 90-30 L   5# 30x B     Bio step 5' L1 5 min L1 6 min L1 6 min L1     Step ups      4\" 10x B; 6\" 2x10 B 2x10B on 6 in step with UE support 3x10 B lateral to 6\" step up                                            HEP: see hand out    [employer] Portal  Treatment/Session Summary:    · Response to Treatment:  Demonstrates sufficient stability during gait to unlock brace for household and community mobility. · Communication/Consultation:  None today  · Equipment provided today:  Hand out on blood clot signs and symptoms  · Recommendations/Intent for next treatment session: Next visit will focus on ROM, strengthening, pain and edema management as indicated.     Total Treatment Billable Duration:  50 mins       Ruddy Gamino DPT    Future Appointments   Date Time Provider Tamara Schultzi   3/24/2021  2:45 PM Monie Factor, DPT [facility]   3/26/2021  3:30 PM Monie Factor, DPT [facility]   3/29/2021  4:15 PM Bere Stake, PTA [facility]   3/31/2021 11:00 AM Monie Factor, DPT [facility]   4/5/2021 11:00 AM Monie Factor, DPT [facility]   4/7/2021 11:00 AM Monie Factor, DPT [facility]   4/8/2021  2:00 PM SIMEON Little   4/12/2021 11:00 AM Monie Factor, DPT [facility]   4/14/2021 11:00 AM Monie Factor, DPT [facility]   4/19/2021 11:00 AM Bere Stake, PTA [facility]   4/21/2021 11:00 AM Bere Stake, PTA [facility]   4/26/2021 11:00 AM Monie Factor, DPT [facility]   4/28/2021 11:00 AM Monie Factor, DPT [facility]

## 2021-03-25 ENCOUNTER — APPOINTMENT (OUTPATIENT)
Dept: PHYSICAL THERAPY | Age: 41
End: 2021-03-25
Payer: COMMERCIAL

## 2021-03-26 ENCOUNTER — APPOINTMENT (OUTPATIENT)
Dept: PHYSICAL THERAPY | Age: 41
End: 2021-03-26
Payer: COMMERCIAL

## 2021-03-26 ENCOUNTER — HOSPITAL ENCOUNTER (OUTPATIENT)
Dept: PHYSICAL THERAPY | Age: 41
Discharge: HOME | End: 2021-03-26
Payer: COMMERCIAL

## 2021-03-26 PROCEDURE — 97110 THERAPEUTIC EXERCISES: CPT

## 2021-03-26 PROCEDURE — 97016 VASOPNEUMATIC DEVICE THERAPY: CPT

## 2021-03-26 NOTE — PROGRESS NOTES
Tao Resendiz  : 1980  Primary: [facility]  Secondary:  [address] @ [address], [place].  [phone]   [phone]      OUTPATIENT PHYSICAL THERAPY: Daily Treatment Note and Progress Note  3/26/2021   ICD-10: Treatment Diagnosis: Pain in right knee (M25.561), Stiffness of right knee, not elsewhere classified (M25.661), Pain in left knee (M25.562) and Stiffness of left knee, not elsewhere classified (M25.662) and Muscle weakness (generalized) (M62.81) and Other abnormalities of gait and mobility (R26.89)     L ACL allograft, B chondroplasty and meniscectomy 21    Effective Dates: 3/2/2021 TO 2021 (90 days). Frequency/Duration: 2-3 times a week for 90 Days  GOALS: (Goals have been discussed and agreed upon with patient.)  Short-Term Functional Goals: Time Frame: 4 weeks  1. Pt to report compliance with HEP MET  2. Pt to achieve 0-125 AROM B knees for normal function and mechanics MET RIGHT, PROGRESSING ON LEFT  3. Pt to demonstrate normal gait level surfaces without assistive device MET AT HOME, CONTINUES TO USE CRUTCH IN THE COMMUNITY  4. Pt to jun progressive ex program without increased pain or swelling. MET  Discharge Goals: Time Frame: 16 weeks  1. Pt to increase strength for reciprocal gait on stairs ONGOING  2. Pt to increase SLS > 20 sec B for safe amb all surfaces ONGOING  3. Pt to resume working without limitation inc navigating stairs consistently ONGOING  4. Pt to resume walking dogs without limitations ONGOING  _______________________________________________________________________________  Pre-treatment Symptoms/Complaints:  Pt notes she has been having some soreness to the right kneecap, but feeling more confident with walking on an unlocked brace.    Pain: Initial: 3-4/10 B LEs Post Session: no increase    Medications Last Reviewed:  3/26/2021    Updated Objective Findings: Observation/Orthostatic Postural Assessment:          Steri-strips intact R med and lat portals; dressing with tegaderm intact over L knee with thick black dried blood contained;  Wearing brace locked at 0 on L   Palpation:          Good patellar mobility B  ROM:         AROM  Knee flex-ext R 102-0, 83-0 L        Calf tightness B  Strength:         Good quad set R, fair quad set L;  Further strength testing deferred at today's visit  Functional Mobility:         Gait/Ambulation:  Pt leans on B axillary crutches with step-to gait pattern, brace at 0        Transfers:  Pt uses UE assist and R LE         Stairs:  Step-to pattern or avoids currrently  Balance:          Unable to assess at today's visit    Tool Used: Lower Extremity Functional Scale (LEFS)  Score:  Initial: 8/80 Most Recent: X/80 (Date: -- )   Interpretation of Score: 20 questions each scored on a 5 point scale with 0 representing \"extreme difficulty or unable to perform\" and 4 representing \"no difficulty\". The lower the score, the greater the functional disability. 80/80 represents no disability. Minimal detectable change is 9 points. UPDATE: 3/22/21  L AROM 0-100 °  R AROM 0-115 °    3/26/21 update:   Left knee: 1-0-110 °   Right knee: 1-0-120 °   Continues to ambulate with hinged brace unlocked 0-60 °. Uses crutch for community mobility. Knee extension: 3+/5 B   SLR: 3/5 L with continued extensor lag, 4/5 R         TREATMENT:   THERAPEUTIC ACTIVITY: ( see below for minutes): Therapeutic activities per grid below to improve mobility. Required moderate verbal and manual cues to improve functional mobility . THERAPEUTIC EXERCISE: (see below for minutes):  Exercises per grid below to improve strength. Required moderate verbal and manual cues to promote proper body alignment, promote proper body posture, promote proper body mechanics and promote proper body breathing techniques.   Progressed resistance, range, repetitions and complexity of movement as indicated. MANUAL THERAPY: (see below for minutes): Joint mobilization and Soft tissue mobilization was utilized and necessary because of the patient's restricted joint motion, painful spasm, loss of articular motion and restricted motion of soft tissue. MODALITIES: (see below for minutes):      for pain modulation    AQUATIC THERAPY (see below for minutes): Aquatic treatment performed per flow grid for Decreased muscle strength, Decreased endurance, Decreased static/dynamic balance and reactive control, Decreased activity endurance, Decompression, Ease of movement and Low impact and reduced weight bearing activity. Date: 3/2/21 3/5/21 (visit 2) 3/8/21 (visit 3) 3/10/21 (visit4) 3/15/21 (visit 5)  3/19/21 (visit 6) 3/22/21 (visit 7) 3/24/21 (visit 8) 3/26/21 (Visit 9)    Modalities: 15 mins 15 min  10 min  10 min  15 min 15 min  15 min 15 min  15 min     Game ready B knees seated Med pressure, coldest temp 15 min 10 min  10 min  15' 15 min  At medium compression 15 min  15 min                  Manual Therapy:                                       Therapeutic Exercises: 25 mins 45 min  45 min  45 min 45 min 45 min  35 min 45 min  45 min     Pt education, postural education, HEP, functional breathing, patella mobs 15 mins      Pt was educated on the signs and symptoms of blood clots/ DVT      Quad set B X 10            SLR B X 10 30x to R; with NMES 10sec on/off x5 min with assist 30x to R; with NMES 10sec on/off x 5 min  With NMES 10sec on/offf x 5 min  With NMES, 10 sec on/off for 5 min 5 min with NMES 10sec on/off; SLR R 30x 5 min with NMES to L LE, 10 sec on/off.  3x10 R LE SLR 30x B 30x B    gastroc stretch B Actively or with strap            Gait mechanics level surfaces With crutches 10 min juliana walk overs cueing quad activation 10 min progressing without crutches  Weight shifts: x10  Walking march in II bars x 4 laps  Walking march through floor x 2 laps Walking marchx 2 laps, juliana walk over x2 laps fwd/x2 laps lateral    Heel slides  20x B 20x B with assist on L 20x B with therapist assist on L x20 B with strap on L 20x with strap B; therapist assisted overpressure on L  3x10 B with strap Therapist assisted stretching into knee flexion Therapist assisted stretching into extension and flexion, hamstring stretch [id number] B    FTKE  40x green tband to L 40x green tband to L 40x green tband to L 3x10 green B        Hip abduction   20x B 20x B x20 B  Lateral band walk green x3 laps in II bars  Lateral band walk green x 3 laps Lateral band walk green x 3 laps; band kick backs 3x10    LAQ   90-0 ° R, 90-30 ° L 30x total 90-0 ° R; 90-30 ° L 30x each x30 each  90-0 R, 90-30 L   5# 30x B 10# 30x B 90-30 °     Bio step     5' L1 5 min L1 6 min L1 6 min L1 6 min L4    Step ups      4\" 10x B; 6\" 2x10 B 2x10B on 6 in step with UE support 3x10 B lateral to 6\" step up Lateral step up 6\" 30x                                           HEP: see hand out    [employer] Portal  Treatment/Session Summary:    · Response to Treatment:  Continues to require hinged brace for safe mobility. Range of motion is progressing but continues to lack full flexion bilaterally. Continued therapy is required to return to safe, independent mobility at home and in the community. · Communication/Consultation:  None today  · Equipment provided today:  Hand out on blood clot signs and symptoms  · Recommendations/Intent for next treatment session: Next visit will focus on ROM, strengthening, pain and edema management as indicated.     Total Treatment Billable Duration:  60 mins  PT Patient Time In/Time Out  Time In: 1430  Time Out: [address], DPT    Future Appointments   Date Time Provider Tamara Brewer   3/29/2021  4:15 PM Nato Craig [facility]   4/1/2021  4:15 PM Kavitha Farrell PTA [facility]   4/6/2021  3:30 PM Kavitha Farrell PTA [facility]   4/8/2021  2:00 PM SIMEON Parkinson [facility]   4/8/2021  4:15 PM Sariah McGee, DPT [facility]   4/12/2021  3:30 PM Sariah McGee, DPT [facility]   4/14/2021  3:30 PM Martin Sours, PTA [facility]   4/19/2021  3:30 PM [facility] McG[facility], [facility]   4/21/2021 11:00 AM Martin Sours, [facility]   4/21/2021  2:00 PM [facility]h McGee, [facility]   4/26/2021  3:30 PM [facility]   4/28/2021  3:30 PM Martin Sours, PTA [facility]

## 2021-03-29 ENCOUNTER — HOSPITAL ENCOUNTER (OUTPATIENT)
Dept: PHYSICAL THERAPY | Age: 41
Discharge: HOME | End: 2021-03-29
Payer: COMMERCIAL

## 2021-03-29 PROCEDURE — 97110 THERAPEUTIC EXERCISES: CPT

## 2021-03-29 PROCEDURE — 97016 VASOPNEUMATIC DEVICE THERAPY: CPT

## 2021-03-29 NOTE — PROGRESS NOTES
Juan Resendiz  : 1980  Primary: [facility]  Secondary:  Patrick Lizama @ [address], [place].  [phone]   [phone]      OUTPATIENT PHYSICAL THERAPY: Daily Treatment Note  3/29/2021   ICD-10: Treatment Diagnosis: Pain in right knee (M25.561), Stiffness of right knee, not elsewhere classified (M25.661), Pain in left knee (M25.562) and Stiffness of left knee, not elsewhere classified (M25.662) and Muscle weakness (generalized) (M62.81) and Other abnormalities of gait and mobility (R26.89)     L ACL allograft, B chondroplasty and meniscectomy 21    Effective Dates: 3/2/2021 TO 2021 (90 days). Frequency/Duration: 2-3 times a week for 90 Days  GOALS: (Goals have been discussed and agreed upon with patient.)  Short-Term Functional Goals: Time Frame: 4 weeks  1. Pt to report compliance with HEP MET  2. Pt to achieve 0-125 AROM B knees for normal function and mechanics MET RIGHT, PROGRESSING ON LEFT  3. Pt to demonstrate normal gait level surfaces without assistive device MET AT HOME, CONTINUES TO USE CRUTCH IN THE COMMUNITY  4. Pt to jun progressive ex program without increased pain or swelling. MET  Discharge Goals: Time Frame: 16 weeks  1. Pt to increase strength for reciprocal gait on stairs ONGOING  2. Pt to increase SLS > 20 sec B for safe amb all surfaces ONGOING  3. Pt to resume working without limitation inc navigating stairs consistently ONGOING  4. Pt to resume walking dogs without limitations ONGOING  _______________________________________________________________________________  Pre-treatment Symptoms/Complaints:  Pt notes continued general weakness. Reports going grocery shopping over the weekend and continued to use a wheelchair due to difficulty with walking all the way around the store.    Pain: Initial: 3-4/10 B LEs Post Session: no increase    Medications Last Reviewed:  3/29/2021    Updated Objective Findings:      Observation/Orthostatic Postural Assessment:          Steri-strips intact R med and lat portals; dressing with tegaderm intact over L knee with thick black dried blood contained;  Wearing brace locked at 0 on L   Palpation:          Good patellar mobility B  ROM:         AROM  Knee flex-ext R 102-0, 83-0 L        Calf tightness B  Strength:         Good quad set R, fair quad set L;  Further strength testing deferred at today's visit  Functional Mobility:         Gait/Ambulation:  Pt leans on B axillary crutches with step-to gait pattern, brace at 0        Transfers:  Pt uses UE assist and R LE         Stairs:  Step-to pattern or avoids currrently  Balance:          Unable to assess at today's visit    Tool Used: Lower Extremity Functional Scale (LEFS)  Score:  Initial: 8/80 Most Recent: X/80 (Date: -- )   Interpretation of Score: 20 questions each scored on a 5 point scale with 0 representing \"extreme difficulty or unable to perform\" and 4 representing \"no difficulty\". The lower the score, the greater the functional disability. 80/80 represents no disability. Minimal detectable change is 9 points. UPDATE: 3/22/21  L AROM 0-100 °  R AROM 0-115 °    3/26/21 update:   Left knee: 1-0-110 °   Right knee: 1-0-120 °   Continues to ambulate with hinged brace unlocked 0-60 °. Uses crutch for community mobility. Knee extension: 3+/5 B   SLR: 3/5 L with continued extensor lag, 4/5 R         TREATMENT:   THERAPEUTIC ACTIVITY: ( see below for minutes): Therapeutic activities per grid below to improve mobility. Required moderate verbal and manual cues to improve functional mobility . THERAPEUTIC EXERCISE: (see below for minutes):  Exercises per grid below to improve strength. Required moderate verbal and manual cues to promote proper body alignment, promote proper body posture, promote proper body mechanics and promote proper body breathing techniques.   Progressed resistance, range, repetitions and complexity of movement as indicated. MANUAL THERAPY: (see below for minutes): Joint mobilization and Soft tissue mobilization was utilized and necessary because of the patient's restricted joint motion, painful spasm, loss of articular motion and restricted motion of soft tissue. MODALITIES: (see below for minutes):      for pain modulation    AQUATIC THERAPY (see below for minutes): Aquatic treatment performed per flow grid for Decreased muscle strength, Decreased endurance, Decreased static/dynamic balance and reactive control, Decreased activity endurance, Decompression, Ease of movement and Low impact and reduced weight bearing activity. Date: 3/2/21 3/5/21 (visit 2) 3/8/21 (visit 3) 3/10/21 (visit4) 3/15/21 (visit 5)  3/19/21 (visit 6) 3/22/21 (visit 7) 3/24/21 (visit 8) 3/26/21 (Visit 9) progress note 3/29/21 (visit 10)   Modalities: 15 mins 15 min  10 min  10 min  15 min 15 min  15 min 15 min  15 min  10 min    Game ready B knees seated Med pressure, coldest temp 15 min 10 min  10 min  15' 15 min  At medium compression 15 min  15 min  10 min                 Manual Therapy:                                       Therapeutic Exercises: 25 mins 45 min  45 min  45 min 45 min 45 min  35 min 45 min  45 min  35 min    Pt education, postural education, HEP, functional breathing, patella mobs 15 mins      Pt was educated on the signs and symptoms of blood clots/ DVT      Quad set B X 10            SLR B X 10 30x to R; with NMES 10sec on/off x5 min with assist 30x to R; with NMES 10sec on/off x 5 min  With NMES 10sec on/offf x 5 min  With NMES, 10 sec on/off for 5 min 5 min with NMES 10sec on/off; SLR R 30x 5 min with NMES to L LE, 10 sec on/off.  3x10 R LE SLR 30x B 30x B SLR 30x B   gastroc stretch B Actively or with strap            Gait mechanics level surfaces With crutches 10 min juliana walk overs cueing quad activation 10 min progressing without crutches  Weight shifts: x10  Walking march in II bars x 4 laps Walking march through floor x 2 laps Walking marchx 2 laps, juliana walk over x2 laps fwd/x2 laps lateral Walking march  X 2 laps   Heel slides  20x B 20x B with assist on L 20x B with therapist assist on L x20 B with strap on L 20x with strap B; therapist assisted overpressure on L  3x10 B with strap Therapist assisted stretching into knee flexion Therapist assisted stretching into extension and flexion, hamstring stretch [id number] B Hamstring stretch [id number] B; overpressure into flexion L   FTKE  40x green tband to L 40x green tband to L 40x green tband to L 3x10 green B        Hip abduction   20x B 20x B x20 B  Lateral band walk green x3 laps in II bars  Lateral band walk green x 3 laps Lateral band walk green x 3 laps; band kick backs 3x10 Lateral band walk green x 3 laps   LAQ   90-0 ° R, 90-30 ° L 30x total 90-0 ° R; 90-30 ° L 30x each x30 each  90-0 R, 90-30 L   5# 30x B 10# 30x B 90-30 °  10# 30x B 90-30 °    Bio step     5' L1 5 min L1 6 min L1 6 min L1 6 min L4 5 min L4   Step ups      4\" 10x B; 6\" 2x10 B 2x10B on 6 in step with UE support 3x10 B lateral to 6\" step up Lateral step up 6\" 30x              Standing hamstring curl 10# to L only 30x                             HEP: see hand out    [employer] Portal  Treatment/Session Summary:    · Response to Treatment:  With walking march she continues to require conscious activation of quad activation at initial contact. · Communication/Consultation:  None today  · Equipment provided today:  Hand out on blood clot signs and symptoms  · Recommendations/Intent for next treatment session: Next visit will focus on ROM, strengthening, pain and edema management as indicated.     Total Treatment Billable Duration:  45 mins   [address]la, DPT    Future Appointments   Date Time Provider Tamara Brewer   4/1/2021  4:15 PM Nato Rowe [facility]   4/6/2021  3:30 PM Jasmin Doyle PTA [facility]   4/8/2021  2:00 PM Parker Gonzalez, [address] [facility] [address]   4/8/2021  4:15 PM [facility] Watts, [facility]   4/12/2021  3:30 PM Marisol Watts, DPT [facility]   4/14/2021  3:30 PM Kashmir Shawandak, PTA [facility]   4/19/2021  3:30 PM Marisol Watts, DPT [facility]   4/21/2021 11:00 AM Kashmir Shawandak, PTA [facility]   4/21/2021  2:00 PM Marisol Watts, DPT [facility]   4/26/2021  3:30 PM Marisol Watts, DPT [facility]   4/28/2021  3:30 PM [facility]

## 2021-03-31 ENCOUNTER — APPOINTMENT (OUTPATIENT)
Dept: PHYSICAL THERAPY | Age: 41
End: 2021-03-31
Payer: COMMERCIAL

## 2021-04-01 ENCOUNTER — HOSPITAL ENCOUNTER (OUTPATIENT)
Dept: PHYSICAL THERAPY | Age: 41
Discharge: HOME | End: 2021-04-01
Payer: COMMERCIAL

## 2021-04-01 PROCEDURE — 97110 THERAPEUTIC EXERCISES: CPT

## 2021-04-01 PROCEDURE — 97016 VASOPNEUMATIC DEVICE THERAPY: CPT

## 2021-04-01 NOTE — PROGRESS NOTES
Johnnie Resendiz  : 1980  Primary: [facility]  Secondary:  [address] @ [address] [place].  [phone]   [phone]      OUTPATIENT PHYSICAL THERAPY: Daily Treatment Note  2021   ICD-10: Treatment Diagnosis: Pain in right knee (M25.561), Stiffness of right knee, not elsewhere classified (M25.661), Pain in left knee (M25.562) and Stiffness of left knee, not elsewhere classified (M25.662) and Muscle weakness (generalized) (M62.81) and Other abnormalities of gait and mobility (R26.89)     L ACL allograft, B chondroplasty and meniscectomy 21    Effective Dates: 3/2/2021 TO 2021 (90 days). Frequency/Duration: 2-3 times a week for 90 Days  GOALS: (Goals have been discussed and agreed upon with patient.)  Short-Term Functional Goals: Time Frame: 4 weeks  1. Pt to report compliance with HEP MET  2. Pt to achieve 0-125 AROM B knees for normal function and mechanics MET RIGHT, PROGRESSING ON LEFT  3. Pt to demonstrate normal gait level surfaces without assistive device MET AT HOME, CONTINUES TO USE CRUTCH IN THE COMMUNITY  4. Pt to jun progressive ex program without increased pain or swelling. MET  Discharge Goals: Time Frame: 16 weeks  1. Pt to increase strength for reciprocal gait on stairs ONGOING  2. Pt to increase SLS > 20 sec B for safe amb all surfaces ONGOING  3. Pt to resume working without limitation inc navigating stairs consistently ONGOING  4. Pt to resume walking dogs without limitations ONGOING  _______________________________________________________________________________  Pre-treatment Symptoms/Complaints:  Pt states that the right knee is more painful than the left.   Pain: Initial: 3-10 B LEs Post Session: no increase    Medications Last Reviewed:  2021    Updated Objective Findings:      Observation/Orthostatic Postural Assessment:          Steri-strips intact R med and lat portals; dressing with tegaderm intact over L knee with thick black dried blood contained;  Wearing brace locked at 0 on L   Palpation:          Good patellar mobility B  ROM:         AROM  Knee flex-ext R 102-0, 83-0 L        Calf tightness B  Strength:         Good quad set R, fair quad set L;  Further strength testing deferred at today's visit  Functional Mobility:         Gait/Ambulation:  Pt leans on B axillary crutches with step-to gait pattern, brace at 0        Transfers:  Pt uses UE assist and R LE         Stairs:  Step-to pattern or avoids currrently  Balance:          Unable to assess at today's visit    Tool Used: Lower Extremity Functional Scale (LEFS)  Score:  Initial: 8/80 Most Recent: X/80 (Date: -- )   Interpretation of Score: 20 questions each scored on a 5 point scale with 0 representing \"extreme difficulty or unable to perform\" and 4 representing \"no difficulty\". The lower the score, the greater the functional disability. 80/80 represents no disability. Minimal detectable change is 9 points. UPDATE: 3/22/21  L AROM 0-100 °  R AROM 0-115 °    3/26/21 update:   Left knee: 1-0-110 °   Right knee: 1-0-120 °   Continues to ambulate with hinged brace unlocked 0-60 °. Uses crutch for community mobility. Knee extension: 3+/5 B   SLR: 3/5 L with continued extensor lag, 4/5 R         TREATMENT:   THERAPEUTIC ACTIVITY: ( see below for minutes): Therapeutic activities per grid below to improve mobility. Required moderate verbal and manual cues to improve functional mobility . THERAPEUTIC EXERCISE: (see below for minutes):  Exercises per grid below to improve strength. Required moderate verbal and manual cues to promote proper body alignment, promote proper body posture, promote proper body mechanics and promote proper body breathing techniques. Progressed resistance, range, repetitions and complexity of movement as indicated.   MANUAL THERAPY: (see below for minutes): Joint mobilization and Soft tissue mobilization was utilized and necessary because of the patient's restricted joint motion, painful spasm, loss of articular motion and restricted motion of soft tissue. MODALITIES: (see below for minutes):      for pain modulation    AQUATIC THERAPY (see below for minutes): Aquatic treatment performed per flow grid for Decreased muscle strength, Decreased endurance, Decreased static/dynamic balance and reactive control, Decreased activity endurance, Decompression, Ease of movement and Low impact and reduced weight bearing activity. Date: 3/2/21 3/5/21 (visit 2) 3/8/21 (visit 3) 3/10/21 (visit4) 3/15/21 (visit 5)  3/19/21 (visit 6) 3/22/21 (visit 7) 3/24/21 (visit 8) 3/26/21 (Visit 9) progress note 3/29/21 (visit 10) 4/1/21 (visit 11)   Modalities: 15 mins 15 min  10 min  10 min  15 min 15 min  15 min 15 min  15 min  10 min  15 min   Game ready B knees seated Med pressure, coldest temp 15 min 10 min  10 min  15' 15 min  At medium compression 15 min  15 min  10 min  15 min                 Manual Therapy:                                          Therapeutic Exercises: 25 mins 45 min  45 min  45 min 45 min 45 min  35 min 45 min  45 min  35 min  30 min   Pt education, postural education, HEP, functional breathing, patella mobs 15 mins      Pt was educated on the signs and symptoms of blood clots/ DVT       Quad set B X 10             SLR B X 10 30x to R; with NMES 10sec on/off x5 min with assist 30x to R; with NMES 10sec on/off x 5 min  With NMES 10sec on/offf x 5 min  With NMES, 10 sec on/off for 5 min 5 min with NMES 10sec on/off; SLR R 30x 5 min with NMES to L LE, 10 sec on/off.  3x10 R LE SLR 30x B 30x B SLR 30x B SLR 3x10 left   gastroc stretch B Actively or with strap             Gait mechanics level surfaces With crutches 10 min juliana walk overs cueing quad activation 10 min progressing without crutches  Weight shifts: x10  Walking march in II bars x 4 laps  Walking march through floor x 2 laps Walking marchx 2 laps, juliana walk over x2 laps fwd/x2 laps lateral Walking march  X 2 laps Walking march over hurdles x3 laps   Heel slides  20x B 20x B with assist on L 20x B with therapist assist on L x20 B with strap on L 20x with strap B; therapist assisted overpressure on L  3x10 B with strap Therapist assisted stretching into knee flexion Therapist assisted stretching into extension and flexion, hamstring stretch [id number] B Hamstring stretch [id number] B; overpressure into flexion L x30 heel slides on the L. B hamstring stretch 30 sec hold x 4 with strap. FTKE  40x green tband to L 40x green tband to L 40x green tband to L 3x10 green B         Hip abduction   20x B 20x B x20 B  Lateral band walk green x3 laps in II bars  Lateral band walk green x 3 laps Lateral band walk green x 3 laps; band kick backs 3x10 Lateral band walk green x 3 laps Lateral band walk x 3 laps   LAQ   90-0 ° R, 90-30 ° L 30x total 90-0 ° R; 90-30 ° L 30x each x30 each  90-0 R, 90-30 L   5# 30x B 10# 30x B 90-30 °  10# 30x B 90-30 °     Bio step     5' L1 5 min L1 6 min L1 6 min L1 6 min L4 5 min L4 L3 5min   Step ups      4\" 10x B; 6\" 2x10 B 2x10B on 6 in step with UE support 3x10 B lateral to 6\" step up Lateral step up 6\" 30x  Forward focusing on terminal knee extension 2x10 ea B and lateral up and over 2x10, 4 in             Standing hamstring curl 10# to L only 30x                                HEP: see hand out    [employer] Portal  Treatment/Session Summary:    · Response to Treatment: Pt reported difficulty with cone marching due to lack of balance. She is able to flex the knee and hip during the cone step overs. She also demonstrates good  with the step ups. Continue POC. · Communication/Consultation:  None today  · Equipment provided today:  Hand out on blood clot signs and symptoms  · Recommendations/Intent for next treatment session: Next visit will focus on ROM, strengthening, pain and edema management as indicated.     Total Treatment Billable Duration:  45 mins  PT Patient Time In/Time Out  Time In: 7811  Time Out: [address], PTA    Future Appointments   Date Time Provider Tamara Brewer   4/6/2021  3:30 PM Lalla Eduardaing, [facility]   4/8/2021  4:15 PM Mushtaq Flores, DPT [facility]   4/12/2021  3:30 PM Mushtaq Flores, DPT [facility]   4/14/2021  3:30 PM Lalla Fearing, PTA [facility]   4/15/2021  9:30 AM SIMEON Recio [facility]   4/19/2021  3:30 PM Mushtaq Flores, DPT [facility]   4/21/2021 11:00 AM Lalla Fearing, PTA [facility]   4/21/2021  2:00 PM Mushtaq Flores, DPT [facility]   4/26/2021  3:30 PM Mushtaq Flores, DPT [facility]   4/28/2021  3:30 PM Lalla Fearing, PTA [facility]

## 2021-04-05 ENCOUNTER — APPOINTMENT (OUTPATIENT)
Dept: PHYSICAL THERAPY | Age: 41
End: 2021-04-05
Payer: COMMERCIAL

## 2021-04-06 ENCOUNTER — HOSPITAL ENCOUNTER (OUTPATIENT)
Dept: PHYSICAL THERAPY | Age: 41
Discharge: HOME | End: 2021-04-06
Payer: COMMERCIAL

## 2021-04-06 PROCEDURE — 97016 VASOPNEUMATIC DEVICE THERAPY: CPT

## 2021-04-06 PROCEDURE — 97110 THERAPEUTIC EXERCISES: CPT

## 2021-04-06 NOTE — PROGRESS NOTES
Kelseymarijane Anitha Resendiz  : 1980  Primary: [facility]  Secondary:  Shiv Nix @ [address], [facility] [place].  [phone]   [phone]      OUTPATIENT PHYSICAL THERAPY: Daily Treatment Note  2021   ICD-10: Treatment Diagnosis: Pain in right knee (M25.561), Stiffness of right knee, not elsewhere classified (M25.661), Pain in left knee (M25.562) and Stiffness of left knee, not elsewhere classified (M25.662) and Muscle weakness (generalized) (M62.81) and Other abnormalities of gait and mobility (R26.89)     L ACL allograft, B chondroplasty and meniscectomy 21    Effective Dates: 3/2/2021 TO 2021 (90 days). Frequency/Duration: 2-3 times a week for 90 Days  GOALS: (Goals have been discussed and agreed upon with patient.)  Short-Term Functional Goals: Time Frame: 4 weeks  1. Pt to report compliance with HEP MET  2. Pt to achieve 0-125 AROM B knees for normal function and mechanics MET RIGHT, PROGRESSING ON LEFT  3. Pt to demonstrate normal gait level surfaces without assistive device MET   4. Pt to jun progressive ex program without increased pain or swelling. MET  Discharge Goals: Time Frame: 16 weeks  1. Pt to increase strength for reciprocal gait on stairs ONGOING  2. Pt to increase SLS > 20 sec B for safe amb all surfaces ONGOING  3. Pt to resume working without limitation inc navigating stairs consistently ONGOING  4. 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   Pain: Initial: 4-5/10 B LEs Post Session: no increase    Medications Last Reviewed:  2021     MD follow up: April 15, 2021    Updated Objective Findings:      Observation/Orthostatic Postural Assessment:          Steri-strips intact R med and lat portals; dressing with tegaderm intact over L knee with thick black dried blood contained;  Wearing brace locked at 0 on L   Palpation:          Good patellar mobility B  ROM:         AROM  Knee flex-ext R 102-0, 83-0 L        Calf tightness B  Strength:         Good quad set R, fair quad set L;  Further strength testing deferred at today's visit  Functional Mobility:         Gait/Ambulation:  Pt leans on B axillary crutches with step-to gait pattern, brace at 0        Transfers:  Pt uses UE assist and R LE         Stairs:  Step-to pattern or avoids currrently  Balance:          Unable to assess at today's visit    Tool Used: Lower Extremity Functional Scale (LEFS)  Score:  Initial: 8/80 Most Recent: X/80 (Date: -- )   Interpretation of Score: 20 questions each scored on a 5 point scale with 0 representing \"extreme difficulty or unable to perform\" and 4 representing \"no difficulty\". The lower the score, the greater the functional disability. 80/80 represents no disability. Minimal detectable change is 9 points. UPDATE: 3/22/21  L AROM 0-100 °  R AROM 0-115 °    3/26/21 update:   Left knee: 1-0-110 °   Right knee: 1-0-120 °   Continues to ambulate with hinged brace unlocked 0-60 °. Uses crutch for community mobility. Knee extension: 3+/5 B   SLR: 3/5 L with continued extensor lag, 4/5 R         TREATMENT:   THERAPEUTIC ACTIVITY: ( see below for minutes): Therapeutic activities per grid below to improve mobility. Required moderate verbal and manual cues to improve functional mobility . THERAPEUTIC EXERCISE: (see below for minutes):  Exercises per grid below to improve strength. Required moderate verbal and manual cues to promote proper body alignment, promote proper body posture, promote proper body mechanics and promote proper body breathing techniques. Progressed resistance, range, repetitions and complexity of movement as indicated.   MANUAL THERAPY: (see below for minutes): Joint mobilization and Soft tissue mobilization was utilized and necessary because of the patient's restricted joint motion, painful spasm, loss of articular motion and restricted motion of soft tissue. MODALITIES: (see below for minutes):      for pain modulation    AQUATIC THERAPY (see below for minutes): Aquatic treatment performed per flow grid for Decreased muscle strength, Decreased endurance, Decreased static/dynamic balance and reactive control, Decreased activity endurance, Decompression, Ease of movement and Low impact and reduced weight bearing activity. Date: 3/2/21 3/5/21 (visit 2) 3/8/21 (visit 3) 3/10/21 (visit4) 3/15/21 (visit 5)  3/19/21 (visit 6) 3/22/21 (visit 7) 3/24/21 (visit 8) 3/26/21 (Visit 9) progress note 3/29/21 (visit 10) 4/1/21 (visit 11) 4/6/21 (visit 12)   Modalities: 15 mins 15 min  10 min  10 min  15 min 15 min  15 min 15 min  15 min  10 min  15 min 15 min   Game ready B knees seated Med pressure, coldest temp 15 min 10 min  10 min  15' 15 min  At medium compression 15 min  15 min  10 min  15 min Medium compression                  Manual Therapy:                                             Therapeutic Exercises: 25 mins 45 min  45 min  45 min 45 min 45 min  35 min 45 min  45 min  35 min  30 min 45 min   Pt education, postural education, HEP, functional breathing, patella mobs 15 mins      Pt was educated on the signs and symptoms of blood clots/ DVT        Quad set B X 10              SLR B X 10 30x to R; with NMES 10sec on/off x5 min with assist 30x to R; with NMES 10sec on/off x 5 min  With NMES 10sec on/offf x 5 min  With NMES, 10 sec on/off for 5 min 5 min with NMES 10sec on/off; SLR R 30x 5 min with NMES to L LE, 10 sec on/off.  3x10 R LE SLR 30x B 30x B SLR 30x B SLR 3x10 left 3x10 B   gastroc stretch B Actively or with strap           1 min hold on L1 and 3x10 heel raises   Gait mechanics level surfaces With crutches 10 min juliana walk overs cueing quad activation 10 min progressing without crutches  Weight shifts: x10  Walking march in II bars x 4 laps  Walking march through floor x 2 laps Walking marchx 2 laps, juliana walk over x2 laps fwd/x2 laps lateral Walking march  X 2 laps Walking march over hurdles x3 laps    Heel slides  20x B 20x B with assist on L 20x B with therapist assist on L x20 B with strap on L 20x with strap B; therapist assisted overpressure on L  3x10 B with strap Therapist assisted stretching into knee flexion Therapist assisted stretching into extension and flexion, hamstring stretch [id number] B Hamstring stretch [id number] B; overpressure into flexion L x30 heel slides on the L. B hamstring stretch 30 sec hold x 4 with strap. x30 heel slides on the left. Hamstring stretch B 30 sec hold x 4   FTKE  40x green tband to L 40x green tband to L 40x green tband to L 3x10 green B          Hip abduction   20x B 20x B x20 B  Lateral band walk green x3 laps in II bars  Lateral band walk green x 3 laps Lateral band walk green x 3 laps; band kick backs 3x10 Lateral band walk green x 3 laps Lateral band walk x 3 laps 5 laps in // bars black   LAQ   90-0 ° R, 90-30 ° L 30x total 90-0 ° R; 90-30 ° L 30x each x30 each  90-0 R, 90-30 L   5# 30x B 10# 30x B 90-30 °  10# 30x B 90-30 °   5# 2x10 B 90-30 °     Bio step     5' L1 5 min L1 6 min L1 6 min L1 6 min L4 5 min L4 L3 5min 6 min L2   Step ups      4\" 10x B; 6\" 2x10 B 2x10B on 6 in step with UE support 3x10 B lateral to 6\" step up Lateral step up 6\" 30x  Forward focusing on terminal knee extension 2x10 ea B and lateral up and over 2x10, 4 in 2x10 ea focusing on terminal knee extension (forward and lateral) on 4 in             Standing hamstring curl 10# to L only 30x                                   HEP: see hand out    MedBridge Portal  Treatment/Session Summary:    · Response to Treatment: Pt reported pain with step ups but she demonstrates good body mechanics. Continue POC.   · Communication/Consultation:  None today  · Equipment provided today:  Hand out on blood clot signs and symptoms  · Recommendations/Intent for next treatment session: Next visit will focus on ROM, strengthening, pain and edema management as indicated.     Total Treatment Billable Duration:  60 mins  PT Patient Time In/Time Out  Time In: 0330  Time Out: Raymond. Pierce Kelly 47, PTA    Future Appointments   Date Time Provider Tamara Brewer   4/8/2021  4:15 PM Redtorres Shah, DPT [facility]   4/12/2021  3:30 PM Redell Alyssa, DPT [facility]   4/14/2021  3:30 PM Vanessa Delaney, PTA [facility]   4/15/2021  9:30 AM SIMEON Stinson   4/19/2021  3:30 PM Redell Alyssa, DPT [facility]   4/21/2021 11:00 AM [facility] Delaney, [facility]   4/21/2021  2:00 PM Aranza Shah, DPT [facility]   4/26/2021  3:30 PM Redell Alyssa, DPT [facility]   4/28/2021  3:30 PM Vanessa Delaney, PTA [facility]

## 2021-04-07 ENCOUNTER — APPOINTMENT (OUTPATIENT)
Dept: PHYSICAL THERAPY | Age: 41
End: 2021-04-07
Payer: COMMERCIAL

## 2021-04-08 ENCOUNTER — HOSPITAL ENCOUNTER (OUTPATIENT)
Dept: PHYSICAL THERAPY | Age: 41
Discharge: HOME | End: 2021-04-08
Payer: COMMERCIAL

## 2021-04-12 ENCOUNTER — HOSPITAL ENCOUNTER (OUTPATIENT)
Dept: PHYSICAL THERAPY | Age: 41
Discharge: HOME | End: 2021-04-12
Payer: COMMERCIAL

## 2021-04-12 PROCEDURE — 97110 THERAPEUTIC EXERCISES: CPT

## 2021-04-12 PROCEDURE — 97016 VASOPNEUMATIC DEVICE THERAPY: CPT

## 2021-04-12 NOTE — PROGRESS NOTES
Aliza Resendiz  : 1980  Primary: [facility]  Secondary:  [facility] @ [address] [place] [address].  [phone]   [phone]      OUTPATIENT PHYSICAL THERAPY: Daily Treatment Note  2021   ICD-10: Treatment Diagnosis: Pain in right knee (M25.561), Stiffness of right knee, not elsewhere classified (M25.661), Pain in left knee (M25.562) and Stiffness of left knee, not elsewhere classified (M25.662) and Muscle weakness (generalized) (M62.81) and Other abnormalities of gait and mobility (R26.89)     L ACL allograft, B chondroplasty and meniscectomy 21    Effective Dates: 3/2/2021 TO 2021 (90 days). Frequency/Duration: 2-3 times a week for 90 Days  GOALS: (Goals have been discussed and agreed upon with patient.)  Short-Term Functional Goals: Time Frame: 4 weeks  1. Pt to report compliance with HEP MET  2. Pt to achieve 0-125 AROM B knees for normal function and mechanics MET RIGHT, PROGRESSING ON LEFT  3. Pt to demonstrate normal gait level surfaces without assistive device MET   4. Pt to jun progressive ex program without increased pain or swelling. MET  Discharge Goals: Time Frame: 16 weeks  1. Pt to increase strength for reciprocal gait on stairs ONGOING  2. Pt to increase SLS > 20 sec B for safe amb all surfaces ONGOING  3. Pt to resume working without limitation inc navigating stairs consistently ONGOING  4. Pt to resume walking dogs without limitations ONGOING  _______________________________________________________________________________  Pre-treatment Symptoms/Complaints:  Continues to have some discomfort at the PFJ's B. Continues to report some lingering fatigue from COVID vaccination last week.    Pain: Initial: 4-5/10 B LEs Post Session: no increase    Medications Last Reviewed:  2021     MD follow up: April 15, 2021    Updated Objective Findings:      Observation/Orthostatic Postural Assessment: Steri-strips intact R med and lat portals; dressing with tegaderm intact over L knee with thick black dried blood contained;  Wearing brace locked at 0 on L   Palpation:          Good patellar mobility B  ROM:         AROM  Knee flex-ext R 102-0, 83-0 L        Calf tightness B  Strength:         Good quad set R, fair quad set L;  Further strength testing deferred at today's visit  Functional Mobility:         Gait/Ambulation:  Pt leans on B axillary crutches with step-to gait pattern, brace at 0        Transfers:  Pt uses UE assist and R LE         Stairs:  Step-to pattern or avoids currrently  Balance:          Unable to assess at today's visit    Tool Used: Lower Extremity Functional Scale (LEFS)  Score:  Initial: 8/80 Most Recent: X/80 (Date: -- )   Interpretation of Score: 20 questions each scored on a 5 point scale with 0 representing \"extreme difficulty or unable to perform\" and 4 representing \"no difficulty\". The lower the score, the greater the functional disability. 80/80 represents no disability. Minimal detectable change is 9 points. UPDATE: 3/22/21  L AROM 0-100 °  R AROM 0-115 °    3/26/21 update:   Left knee: 1-0-110 °   Right knee: 1-0-120 °   Continues to ambulate with hinged brace unlocked 0-60 °. Uses crutch for community mobility. Knee extension: 3+/5 B   SLR: 3/5 L with continued extensor lag, 4/5 R         TREATMENT:   THERAPEUTIC ACTIVITY: ( see below for minutes): Therapeutic activities per grid below to improve mobility. Required moderate verbal and manual cues to improve functional mobility . THERAPEUTIC EXERCISE: (see below for minutes):  Exercises per grid below to improve strength. Required moderate verbal and manual cues to promote proper body alignment, promote proper body posture, promote proper body mechanics and promote proper body breathing techniques. Progressed resistance, range, repetitions and complexity of movement as indicated.   MANUAL THERAPY: (see below for minutes): Joint mobilization and Soft tissue mobilization was utilized and necessary because of the patient's restricted joint motion, painful spasm, loss of articular motion and restricted motion of soft tissue. MODALITIES: (see below for minutes):      for pain modulation    AQUATIC THERAPY (see below for minutes): Aquatic treatment performed per flow grid for Decreased muscle strength, Decreased endurance, Decreased static/dynamic balance and reactive control, Decreased activity endurance, Decompression, Ease of movement and Low impact and reduced weight bearing activity. Date: 3/19/21 (visit 6) 3/22/21 (visit 7) 3/24/21 (visit 8) 3/26/21 (Visit 9) progress note 3/29/21 (visit 10) 4/1/21 (visit 11) 4/6/21 (visit 12) 4/12/21 (visit 13)     Modalities: 15 min  15 min 15 min  15 min  10 min  15 min 15 min 15 min      Game ready B knees 15 min  At medium compression 15 min  15 min  10 min  15 min Medium compression Med compression 15 min                   Manual Therapy:                                       Therapeutic Exercises: 45 min  35 min 45 min  45 min  35 min  30 min 45 min 45 min      Pt education, postural education, HEP, functional breathing, patella mobs  Pt was educated on the signs and symptoms of blood clots/ DVT           Quad set B             SLR B 5 min with NMES 10sec on/off; SLR R 30x 5 min with NMES to L LE, 10 sec on/off.  3x10 R LE SLR 30x B 30x B SLR 30x B SLR 3x10 left 3x10 B 3x10 B     gastroc stretch B       1 min hold on L1 and 3x10 heel raises 2x1 min      Gait mechanics level surfaces Walking march in II bars x 4 laps  Walking march through floor x 2 laps Walking marchx 2 laps, juliana walk over x2 laps fwd/x2 laps lateral Walking march  X 2 laps Walking march over hurdles x3 laps       Heel slides 20x with strap B; therapist assisted overpressure on L  3x10 B with strap Therapist assisted stretching into knee flexion Therapist assisted stretching into extension and flexion, hamstring stretch [id number] B Hamstring stretch [id number] B; overpressure into flexion L x30 heel slides on the L. B hamstring stretch 30 sec hold x 4 with strap. x30 heel slides on the left. Hamstring stretch B 30 sec hold x 4      FTKE             Hip abduction Lateral band walk green x3 laps in II bars  Lateral band walk green x 3 laps Lateral band walk green x 3 laps; band kick backs 3x10 Lateral band walk green x 3 laps Lateral band walk x 3 laps 5 laps in // bars black Lateral band walk green loop on open floor x3 laps, kick backs 30x     LAQ   5# 30x B 10# 30x B 90-30 °  10# 30x B 90-30 °   5# 2x10 B 90-30 °   10# 3x10 B 90 -30 °      Bio step 5 min L1 6 min L1 6 min L1 6 min L4 5 min L4 L3 5min 6 min L2 6 min L4     Step ups 4\" 10x B; 6\" 2x10 B 2x10B on 6 in step with UE support 3x10 B lateral to 6\" step up Lateral step up 6\" 30x  Forward focusing on terminal knee extension 2x10 ea B and lateral up and over 2x10, 4 in 2x10 ea focusing on terminal knee extension (forward and lateral) on 4 in           Standing hamstring curl 10# to L only 30x                Mini squat 3x10                   HEP: see hand out    Med[employer] Portal  Treatment/Session Summary:    · Response to Treatment: Progressed to mini squats but she was apprehensive with descending below 30 ° of knee flexion. · Communication/Consultation:  None today  · Equipment provided today:  Hand out on blood clot signs and symptoms  · Recommendations/Intent for next treatment session: Next visit will focus on ROM, strengthening, pain and edema management as indicated.     Total Treatment Billable Duration:  60 mins  PT Patient Time In/Time Out  Time In: 8318  Time Out: [address], DPT    Future Appointments   Date Time Provider Tamara Brewer   4/14/2021  3:30 PM Nato [place] [facility]   4/15/2021  9:30 AM SIMEON Rosenberg   4/19/2021  3:30 PM Kory Waldron DPT [facility]   [facility]/21[facility]2021 11[facility]   4/21/2021  2:00 PM Carla Don DPT [facility]   4/26/2021  3:30 PM Carla Don DPT [facility]   4/28/2021  3:30 PM Augustine Vázquez PTA [facility]

## 2021-04-14 ENCOUNTER — HOSPITAL ENCOUNTER (OUTPATIENT)
Dept: PHYSICAL THERAPY | Age: 41
Discharge: HOME | End: 2021-04-14
Payer: COMMERCIAL

## 2021-04-16 ENCOUNTER — HOSPITAL ENCOUNTER (OUTPATIENT)
Dept: PHYSICAL THERAPY | Age: 41
Discharge: HOME | End: 2021-04-16
Payer: COMMERCIAL

## 2021-04-16 PROCEDURE — 97110 THERAPEUTIC EXERCISES: CPT

## 2021-04-16 PROCEDURE — 97016 VASOPNEUMATIC DEVICE THERAPY: CPT

## 2021-04-16 NOTE — PROGRESS NOTES
Salma Clemente Resendiz  : 1980  Primary: [facility]  Secondary:  [id number] [facility] @ [address], [address] [place] [address]  [phone]   [phone]      OUTPATIENT PHYSICAL THERAPY: Daily Treatment Note  2021   ICD-10: Treatment Diagnosis: Pain in right knee (M25.561), Stiffness of right knee, not elsewhere classified (M25.661), Pain in left knee (M25.562) and Stiffness of left knee, not elsewhere classified (M25.662) and Muscle weakness (generalized) (M62.81) and Other abnormalities of gait and mobility (R26.89)     L ACL allograft, B chondroplasty and meniscectomy 21    Effective Dates: 3/2/2021 TO 2021 (90 days). Frequency/Duration: 2-3 times a week for 90 Days  GOALS: (Goals have been discussed and agreed upon with patient.)  Short-Term Functional Goals: Time Frame: 4 weeks  1. Pt to report compliance with HEP MET  2. Pt to achieve 0-125 AROM B knees for normal function and mechanics MET RIGHT, PROGRESSING ON LEFT  3. Pt to demonstrate normal gait level surfaces without assistive device MET   4. Pt to jun progressive ex program without increased pain or swelling. MET  Discharge Goals: Time Frame: 16 weeks  1. Pt to increase strength for reciprocal gait on stairs ONGOING  2. Pt to increase SLS > 20 sec B for safe amb all surfaces ONGOING  3. Pt to resume working without limitation inc navigating stairs consistently ONGOING  4. Pt to resume walking dogs without limitations ONGOING  _______________________________________________________________________________  Pre-treatment Symptoms/Complaints:  Anterior knee pain has diminished. Had follow up with MD who is pleased with her progress. Will remain out of work for approximately 6 more weeks.    Pain: Initial: 4-510 B LEs Post Session: no increase    Medications Last Reviewed:  2021     MD follow up: April 15, 2021    Updated Objective Findings:      Observation/Orthostatic Postural Assessment:          Steri-strips intact R med and lat portals; dressing with tegaderm intact over L knee with thick black dried blood contained;  Wearing brace locked at 0 on L   Palpation:          Good patellar mobility B  ROM:         AROM  Knee flex-ext R 102-0, 83-0 L        Calf tightness B  Strength:         Good quad set R, fair quad set L;  Further strength testing deferred at today's visit  Functional Mobility:         Gait/Ambulation:  Pt leans on B axillary crutches with step-to gait pattern, brace at 0        Transfers:  Pt uses UE assist and R LE         Stairs:  Step-to pattern or avoids currrently  Balance:          Unable to assess at today's visit    Tool Used: Lower Extremity Functional Scale (LEFS)  Score:  Initial: 8/80 Most Recent: X/80 (Date: -- )   Interpretation of Score: 20 questions each scored on a 5 point scale with 0 representing \"extreme difficulty or unable to perform\" and 4 representing \"no difficulty\". The lower the score, the greater the functional disability. 80/80 represents no disability. Minimal detectable change is 9 points. UPDATE: 3/22/21  L AROM 0-100 °  R AROM 0-115 °    3/26/21 update:   Left knee: 1-0-110 °   Right knee: 1-0-120 °   Continues to ambulate with hinged brace unlocked 0-60 °. Uses crutch for community mobility. Knee extension: 3+/5 B   SLR: 3/5 L with continued extensor lag, 4/5 R         TREATMENT:   THERAPEUTIC ACTIVITY: ( see below for minutes): Therapeutic activities per grid below to improve mobility. Required moderate verbal and manual cues to improve functional mobility . THERAPEUTIC EXERCISE: (see below for minutes):  Exercises per grid below to improve strength. Required moderate verbal and manual cues to promote proper body alignment, promote proper body posture, promote proper body mechanics and promote proper body breathing techniques. Progressed resistance, range, repetitions and complexity of movement as indicated.   MANUAL THERAPY: (see below for minutes): Joint mobilization and Soft tissue mobilization was utilized and necessary because of the patient's restricted joint motion, painful spasm, loss of articular motion and restricted motion of soft tissue. MODALITIES: (see below for minutes):      for pain modulation    AQUATIC THERAPY (see below for minutes): Aquatic treatment performed per flow grid for Decreased muscle strength, Decreased endurance, Decreased static/dynamic balance and reactive control, Decreased activity endurance, Decompression, Ease of movement and Low impact and reduced weight bearing activity. Date: 3/19/21 (visit 6) 3/22/21 (visit 7) 3/24/21 (visit 8) 3/26/21 (Visit 9) progress note 3/29/21 (visit 10) 4/1/21 (visit 11) 4/6/21 (visit 12) 4/12/21 (visit 13) 4/16/21 (visit 14)    Modalities: 15 min  15 min 15 min  15 min  10 min  15 min 15 min 15 min  15 min     Game ready B knees 15 min  At medium compression 15 min  15 min  10 min  15 min Medium compression Med compression 15 min  15 min                  Manual Therapy:                                       Therapeutic Exercises: 45 min  35 min 45 min  45 min  35 min  30 min 45 min 45 min  45 min     Pt education, postural education, HEP, functional breathing, patella mobs  Pt was educated on the signs and symptoms of blood clots/ DVT           Quad set B             SLR B 5 min with NMES 10sec on/off; SLR R 30x 5 min with NMES to L LE, 10 sec on/off.  3x10 R LE SLR 30x B 30x B SLR 30x B SLR 3x10 left 3x10 B 3x10 B     gastroc stretch B       1 min hold on L1 and 3x10 heel raises 2x1 min  1 min calf stretch, 30x calf raise    Gait mechanics level surfaces Walking march in II bars x 4 laps  Walking march through floor x 2 laps Walking marchx 2 laps, juliana walk over x2 laps fwd/x2 laps lateral Walking march  X 2 laps Walking march over hurdles x3 laps       Heel slides 20x with strap B; therapist assisted overpressure on L  3x10 B with strap Therapist assisted stretching into knee flexion Therapist assisted stretching into extension and flexion, hamstring stretch [id number] B Hamstring stretch [id number] B; overpressure into flexion L x30 heel slides on the L. B hamstring stretch 30 sec hold x 4 with strap. x30 heel slides on the left. Hamstring stretch B 30 sec hold x 4  2x30 sec hamstring and ITB stretch B    FTKE             Hip abduction Lateral band walk green x3 laps in II bars  Lateral band walk green x 3 laps Lateral band walk green x 3 laps; band kick backs 3x10 Lateral band walk green x 3 laps Lateral band walk x 3 laps 5 laps in // bars black Lateral band walk green loop on open floor x3 laps, kick backs 30x Lateral band walk green x 3 laps, kick backs 3x10    LAQ   5# 30x B 10# 30x B 90-30 °  10# 30x B 90-30 °   5# 2x10 B 90-30 °   10# 3x10 B 90 -30 °  10# 3x10 B 90-30 °     Bio step 5 min L1 6 min L1 6 min L1 6 min L4 5 min L4 L3 5min 6 min L2 6 min L4 6 min L4    Step ups 4\" 10x B; 6\" 2x10 B 2x10B on 6 in step with UE support 3x10 B lateral to 6\" step up Lateral step up 6\" 30x  Forward focusing on terminal knee extension 2x10 ea B and lateral up and over 2x10, 4 in 2x10 ea focusing on terminal knee extension (forward and lateral) on 4 in  Lateral 6\" 3x8 B cueing quad and glute activation         Standing hamstring curl 10# to L only 30x                Mini squat 3x10  3x10                 HEP: see hand out    [employer] [facility]  Treatment/Session Summary:    · Response to Treatment: demonstrates sufficient healing and stability to begin weaning from brace. · Communication/Consultation:  None today  · Equipment provided today:  Hand out on blood clot signs and symptoms  · Recommendations/Intent for next treatment session: Next visit will focus on ROM, strengthening, pain and edema management as indicated.     Total Treatment Billable Duration:  60 mins  PT Patient Time In/Time Out  Time In: 1400  Time Out: [address], DPT    Future Appointments   Date Time Provider Tamara Schultzi   4/19/2021  3:30 PM Carlene Power, SONIAT [facility]   4/21/2021 11:00 AM TIGRE Garber [facility]   4/21/2021  2:00 PM Carlene Power, PABLO [facility]   4/26/2021  3:30 PM Carlene Power, SONIAT [facility]   4/28/2021  3:30 PM TIGRE Garber [facility]

## 2021-04-19 ENCOUNTER — APPOINTMENT (OUTPATIENT)
Dept: PHYSICAL THERAPY | Age: 41
End: 2021-04-19
Payer: COMMERCIAL

## 2021-04-19 ENCOUNTER — HOSPITAL ENCOUNTER (OUTPATIENT)
Dept: PHYSICAL THERAPY | Age: 41
Discharge: HOME | End: 2021-04-19
Payer: COMMERCIAL

## 2021-04-19 PROCEDURE — 97110 THERAPEUTIC EXERCISES: CPT

## 2021-04-19 PROCEDURE — 97016 VASOPNEUMATIC DEVICE THERAPY: CPT

## 2021-04-19 NOTE — PROGRESS NOTES
Rob Resendiz  : 1980  Primary: [facility]  Secondary:  Sofie Rothman @ [address], [facility] [place].  [phone]   [phone]      OUTPATIENT PHYSICAL THERAPY: Daily Treatment Note  2021   ICD-10: Treatment Diagnosis: Pain in right knee (M25.561), Stiffness of right knee, not elsewhere classified (M25.661), Pain in left knee (M25.562) and Stiffness of left knee, not elsewhere classified (M25.662) and Muscle weakness (generalized) (M62.81) and Other abnormalities of gait and mobility (R26.89)     L ACL allograft, B chondroplasty and meniscectomy 21    Effective Dates: 3/2/2021 TO 2021 (90 days). Frequency/Duration: 2-3 times a week for 90 Days  GOALS: (Goals have been discussed and agreed upon with patient.)  Short-Term Functional Goals: Time Frame: 4 weeks  1. Pt to report compliance with HEP MET  2. Pt to achieve 0-125 AROM B knees for normal function and mechanics MET RIGHT, PROGRESSING ON LEFT  3. Pt to demonstrate normal gait level surfaces without assistive device MET   4. Pt to jun progressive ex program without increased pain or swelling. MET  Discharge Goals: Time Frame: 16 weeks  1. Pt to increase strength for reciprocal gait on stairs ONGOING  2. Pt to increase SLS > 20 sec B for safe amb all surfaces ONGOING  3. Pt to resume working without limitation inc navigating stairs consistently ONGOING  4. Pt to resume walking dogs without limitations ONGOING  _______________________________________________________________________________  Pre-treatment Symptoms/Complaints:  Notes she is having less discomfort at the anterior knees. Feeling comfortable without the brace at home. Getting increasingly comfortable without the brace in the community.     Pain: Initial: 4-5/10 B LEs Post Session: no increase    Medications Last Reviewed:  2021     MD follow up: April 15, 2021    Updated Objective Findings: Observation/Orthostatic Postural Assessment:          Steri-strips intact R med and lat portals; dressing with tegaderm intact over L knee with thick black dried blood contained;  Wearing brace locked at 0 on L   Palpation:          Good patellar mobility B  ROM:         AROM  Knee flex-ext R 102-0, 83-0 L        Calf tightness B  Strength:         Good quad set R, fair quad set L;  Further strength testing deferred at today's visit  Functional Mobility:         Gait/Ambulation:  Pt leans on B axillary crutches with step-to gait pattern, brace at 0        Transfers:  Pt uses UE assist and R LE         Stairs:  Step-to pattern or avoids currrently  Balance:          Unable to assess at today's visit    Tool Used: Lower Extremity Functional Scale (LEFS)  Score:  Initial: 8/80 Most Recent: X/80 (Date: -- )   Interpretation of Score: 20 questions each scored on a 5 point scale with 0 representing \"extreme difficulty or unable to perform\" and 4 representing \"no difficulty\". The lower the score, the greater the functional disability. 80/80 represents no disability. Minimal detectable change is 9 points. UPDATE: 3/22/21  L AROM 0-100 °  R AROM 0-115 °    3/26/21 update:   Left knee: 1-0-110 °   Right knee: 1-0-120 °   Continues to ambulate with hinged brace unlocked 0-60 °. Uses crutch for community mobility. Knee extension: 3+/5 B   SLR: 3/5 L with continued extensor lag, 4/5 R         TREATMENT:   THERAPEUTIC ACTIVITY: ( see below for minutes): Therapeutic activities per grid below to improve mobility. Required moderate verbal and manual cues to improve functional mobility . THERAPEUTIC EXERCISE: (see below for minutes):  Exercises per grid below to improve strength. Required moderate verbal and manual cues to promote proper body alignment, promote proper body posture, promote proper body mechanics and promote proper body breathing techniques.   Progressed resistance, range, repetitions and complexity of movement as indicated. MANUAL THERAPY: (see below for minutes): Joint mobilization and Soft tissue mobilization was utilized and necessary because of the patient's restricted joint motion, painful spasm, loss of articular motion and restricted motion of soft tissue. MODALITIES: (see below for minutes):      for pain modulation    AQUATIC THERAPY (see below for minutes): Aquatic treatment performed per flow grid for Decreased muscle strength, Decreased endurance, Decreased static/dynamic balance and reactive control, Decreased activity endurance, Decompression, Ease of movement and Low impact and reduced weight bearing activity. Date: 3/19/21 (visit 6) 3/22/21 (visit 7) 3/24/21 (visit 8) 3/26/21 (Visit 9) progress note 3/29/21 (visit 10) 4/1/21 (visit 11) 4/6/21 (visit 12) 4/12/21 (visit 13) 4/16/21 (visit 14) 4/19/21 (visit 15)   Modalities: 15 min  15 min 15 min  15 min  10 min  15 min 15 min 15 min  15 min  15 min    Game ready B knees 15 min  At medium compression 15 min  15 min  10 min  15 min Medium compression Med compression 15 min  15 min  15 min                 Manual Therapy:                                       Therapeutic Exercises: 45 min  35 min 45 min  45 min  35 min  30 min 45 min 45 min  45 min  45 min    Pt education, postural education, HEP, functional breathing, patella mobs  Pt was educated on the signs and symptoms of blood clots/ DVT           Quad set B             SLR B 5 min with NMES 10sec on/off; SLR R 30x 5 min with NMES to L LE, 10 sec on/off.  3x10 R LE SLR 30x B 30x B SLR 30x B SLR 3x10 left 3x10 B 3x10 B     gastroc stretch B       1 min hold on L1 and 3x10 heel raises 2x1 min  1 min calf stretch, 30x calf raise 2x1 min calf stretch L2, calf raises L1 3x10   Gait mechanics level surfaces Walking march in II bars x 4 laps  Walking march through floor x 2 laps Walking marchx 2 laps, juliana walk over x2 laps fwd/x2 laps lateral Walking march  X 2 laps Walking march over hurdles x3 laps       Heel slides 20x with strap B; therapist assisted overpressure on L  3x10 B with strap Therapist assisted stretching into knee flexion Therapist assisted stretching into extension and flexion, hamstring stretch [id number] B Hamstring stretch [id number] B; overpressure into flexion L x30 heel slides on the L. B hamstring stretch 30 sec hold x 4 with strap. x30 heel slides on the left. Hamstring stretch B 30 sec hold x 4  2x30 sec hamstring and ITB stretch B [id number] to hamstring, mobs to L into extension gr 3 10x   FTKE             Hip abduction Lateral band walk green x3 laps in II bars  Lateral band walk green x 3 laps Lateral band walk green x 3 laps; band kick backs 3x10 Lateral band walk green x 3 laps Lateral band walk x 3 laps 5 laps in // bars black Lateral band walk green loop on open floor x3 laps, kick backs 30x Lateral band walk green x 3 laps, kick backs 3x10 Lateral band walk green x3 laps, kick backs 2x10   LAQ   5# 30x B 10# 30x B 90-30 °  10# 30x B 90-30 °   5# 2x10 B 90-30 °   10# 3x10 B 90 -30 °  10# 3x10 B 90-30 °  repeat   Bio step 5 min L1 6 min L1 6 min L1 6 min L4 5 min L4 L3 5min 6 min L2 6 min L4 6 min L4 6 min L4   Step ups 4\" 10x B; 6\" 2x10 B 2x10B on 6 in step with UE support 3x10 B lateral to 6\" step up Lateral step up 6\" 30x  Forward focusing on terminal knee extension 2x10 ea B and lateral up and over 2x10, 4 in 2x10 ea focusing on terminal knee extension (forward and lateral) on 4 in  Lateral 6\" 3x8 B cueing quad and glute activation Lateral 6\" 3x10 B         Standing hamstring curl 10# to L only 30x     Bridge 3x10           Mini squat 3x10  3x10 Sit to stand from lo mat table 3x10                HEP: see hand out    [employer] Portal  Treatment/Session Summary:    · Response to Treatment: with increased depth on sit to stand she requires continued cueing to maintain frontal plane knee alignment.    · Communication/Consultation:  None today  · Equipment provided today:  Hand out on blood clot signs and symptoms  · Recommendations/Intent for next treatment session: Next visit will focus on ROM, strengthening, pain and edema management as indicated.     Total Treatment Billable Duration:  60 mins  PT Patient Time In/Time Out  Time In: 0088  Time Out: [address], DPT    Future Appointments   Date Time Provider Tamara Brewer   4/21/2021 11:00 AM Nato Johansen [facility]   4/21/2021  2:00 PM Benjamin Najera DPT [facility]   4/26/2021  3:30 PM Benajmin Najera DPT [facility]   4/28/2021  3:30 PM Aviva Ochoa PTA [facility]

## 2021-04-21 ENCOUNTER — APPOINTMENT (OUTPATIENT)
Dept: PHYSICAL THERAPY | Age: 41
End: 2021-04-21
Payer: COMMERCIAL

## 2021-04-21 ENCOUNTER — HOSPITAL ENCOUNTER (OUTPATIENT)
Dept: PHYSICAL THERAPY | Age: 41
Discharge: HOME | End: 2021-04-21
Payer: COMMERCIAL

## 2021-04-21 PROCEDURE — 97016 VASOPNEUMATIC DEVICE THERAPY: CPT

## 2021-04-21 PROCEDURE — 97110 THERAPEUTIC EXERCISES: CPT

## 2021-04-21 NOTE — PROGRESS NOTES
Debbie Resendiz  : 1980  Primary: [facility]  Secondary:  Chato Banuelos @ [place] [address].  [phone]   [phone]      OUTPATIENT PHYSICAL THERAPY: Daily Treatment Note  2021   ICD-10: Treatment Diagnosis: Pain in right knee (M25.561), Stiffness of right knee, not elsewhere classified (M25.661), Pain in left knee (M25.562) and Stiffness of left knee, not elsewhere classified (M25.662) and Muscle weakness (generalized) (M62.81) and Other abnormalities of gait and mobility (R26.89)     L ACL allograft, B chondroplasty and meniscectomy 21    Effective Dates: 3/2/2021 TO 2021 (90 days). Frequency/Duration: 2-3 times a week for 90 Days  GOALS: (Goals have been discussed and agreed upon with patient.)  Short-Term Functional Goals: Time Frame: 4 weeks  1. Pt to report compliance with HEP MET  2. Pt to achieve 0-125 AROM B knees for normal function and mechanics MET RIGHT, PROGRESSING ON LEFT  3. Pt to demonstrate normal gait level surfaces without assistive device MET   4. Pt to jun progressive ex program without increased pain or swelling. MET  Discharge Goals: Time Frame: 16 weeks  1. Pt to increase strength for reciprocal gait on stairs ONGOING  2. Pt to increase SLS > 20 sec B for safe amb all surfaces ONGOING  3. Pt to resume working without limitation inc navigating stairs consistently ONGOING  4. Pt to resume walking dogs without limitations ONGOING  _______________________________________________________________________________  Pre-treatment Symptoms/Complaints:  Pt states \"I'm sore from the session on Monday. \"  Pain: Initial: -5/10 B LEs Post Session: no increase    Medications Last Reviewed:  2021     MD follow up: April 15, 2021    Updated Objective Findings:      Observation/Orthostatic Postural Assessment:          Steri-strips intact R med and lat portals; dressing with tegaderm intact over L knee with thick black dried blood contained;  Wearing brace locked at 0 on L   Palpation:          Good patellar mobility B  ROM:         AROM  Knee flex-ext R 102-0, 83-0 L        Calf tightness B  Strength:         Good quad set R, fair quad set L;  Further strength testing deferred at today's visit  Functional Mobility:         Gait/Ambulation:  Pt leans on B axillary crutches with step-to gait pattern, brace at 0        Transfers:  Pt uses UE assist and R LE         Stairs:  Step-to pattern or avoids currrently  Balance:          Unable to assess at today's visit    Tool Used: Lower Extremity Functional Scale (LEFS)  Score:  Initial: 8/80 Most Recent: X/80 (Date: -- )   Interpretation of Score: 20 questions each scored on a 5 point scale with 0 representing \"extreme difficulty or unable to perform\" and 4 representing \"no difficulty\". The lower the score, the greater the functional disability. 80/80 represents no disability. Minimal detectable change is 9 points. UPDATE: 3/22/21  L AROM 0-100 °  R AROM 0-115 °    3/26/21 update:   Left knee: 1-0-110 °   Right knee: 1-0-120 °   Continues to ambulate with hinged brace unlocked 0-60 °. Uses crutch for community mobility. Knee extension: 3+/5 B   SLR: 3/5 L with continued extensor lag, 4/5 R    4/21/21 update  B knee extension is 0 °         TREATMENT:   THERAPEUTIC ACTIVITY: ( see below for minutes): Therapeutic activities per grid below to improve mobility. Required moderate verbal and manual cues to improve functional mobility . THERAPEUTIC EXERCISE: (see below for minutes):  Exercises per grid below to improve strength. Required moderate verbal and manual cues to promote proper body alignment, promote proper body posture, promote proper body mechanics and promote proper body breathing techniques. Progressed resistance, range, repetitions and complexity of movement as indicated.   MANUAL THERAPY: (see below for minutes): Joint mobilization and Soft tissue mobilization was utilized and necessary because of the patient's restricted joint motion, painful spasm, loss of articular motion and restricted motion of soft tissue. MODALITIES: (see below for minutes):      for pain modulation    AQUATIC THERAPY (see below for minutes): Aquatic treatment performed per flow grid for Decreased muscle strength, Decreased endurance, Decreased static/dynamic balance and reactive control, Decreased activity endurance, Decompression, Ease of movement and Low impact and reduced weight bearing activity. Date: 3/19/21 (visit 6) 3/22/21 (visit 7) 3/24/21 (visit 8) 3/26/21 (Visit 9) progress note 3/29/21 (visit 10) 4/1/21 (visit 11) 4/6/21 (visit 12) 4/12/21 (visit 13) 4/16/21 (visit 14) 4/19/21 (visit 15) 4/21/21 (visit 16)   Modalities: 15 min  15 min 15 min  15 min  10 min  15 min 15 min 15 min  15 min  15 min  15 min   Game ready B knees 15 min  At medium compression 15 min  15 min  10 min  15 min Medium compression Med compression 15 min  15 min  15 min  Bilateral LEs                 Manual Therapy:                                          Therapeutic Exercises: 45 min  35 min 45 min  45 min  35 min  30 min 45 min 45 min  45 min  45 min  45 min   Pt education, postural education, HEP, functional breathing, patella mobs  Pt was educated on the signs and symptoms of blood clots/ DVT            Quad set B              SLR B 5 min with NMES 10sec on/off; SLR R 30x 5 min with NMES to L LE, 10 sec on/off.  3x10 R LE SLR 30x B 30x B SLR 30x B SLR 3x10 left 3x10 B 3x10 B   3x10 B   gastroc stretch B       1 min hold on L1 and 3x10 heel raises 2x1 min  1 min calf stretch, 30x calf raise 2x1 min calf stretch L2, calf raises L1 3x10 1 min hold calf stretch and heel raises 3x10   Gait mechanics level surfaces Walking march in II bars x 4 laps  Walking march through floor x 2 laps Walking marchx 2 laps, juliana walk over x2 laps fwd/x2 laps lateral Walking march  X 2 laps Walking march over hurdles x3 laps        Heel slides 20x with strap B; therapist assisted overpressure on L  3x10 B with strap Therapist assisted stretching into knee flexion Therapist assisted stretching into extension and flexion, hamstring stretch [id number] B Hamstring stretch [id number] B; overpressure into flexion L x30 heel slides on the L. B hamstring stretch 30 sec hold x 4 with strap. x30 heel slides on the left.  Hamstring stretch B 30 sec hold x 4  2x30 sec hamstring and ITB stretch B [id number] to hamstring, mobs to L into extension gr 3 10x x30 B with strap   FTKE              Hip abduction Lateral band walk green x3 laps in II bars  Lateral band walk green x 3 laps Lateral band walk green x 3 laps; band kick backs 3x10 Lateral band walk green x 3 laps Lateral band walk x 3 laps 5 laps in // bars black Lateral band walk green loop on open floor x3 laps, kick backs 30x Lateral band walk green x 3 laps, kick backs 3x10 Lateral band walk green x3 laps, kick backs 2x10 Lateral band walk 2 L blue CLX and kick backs 3x10 B   LAQ   5# 30x B 10# 30x B 90-30 °  10# 30x B 90-30 °   5# 2x10 B 90-30 °   10# 3x10 B 90 -30 °  10# 3x10 B 90-30 °  repeat    Bio step 5 min L1 6 min L1 6 min L1 6 min L4 5 min L4 L3 5min 6 min L2 6 min L4 6 min L4 6 min L4 5 min L4   Step ups 4\" 10x B; 6\" 2x10 B 2x10B on 6 in step with UE support 3x10 B lateral to 6\" step up Lateral step up 6\" 30x  Forward focusing on terminal knee extension 2x10 ea B and lateral up and over 2x10, 4 in 2x10 ea focusing on terminal knee extension (forward and lateral) on 4 in  Lateral 6\" 3x8 B cueing quad and glute activation Lateral 6\" 3x10 B  Forward 2x10 B onto 6 in        Standing hamstring curl 10# to L only 30x     Bridge 3x10 Bridge 3x10           Mini squat 3x10  3x10 Sit to stand from lo mat table 3x10 STS from chair with UE support 3x10                 HEP: see hand out    MedBridge Portal  Treatment/Session Summary:    · Response to Treatment: Pt has full B knee extension but step ups and sit to stands are difficult due to quadriceps weakness. Continue POC. · Communication/Consultation:  None today  · Equipment provided today:  Hand out on blood clot signs and symptoms  · Recommendations/Intent for next treatment session: Next visit will focus on ROM, strengthening, pain and edema management as indicated.     Total Treatment Billable Duration:  60 mins  PT Patient Time In/Time Out  Time In: 1100  Time Out: 1200    Matthew Carpenter PTA    Future Appointments   Date Time Provider Tamara Brewer   4/21/2021  2:00 PM Steph Actis, DPT [facility]   4/26/2021  3:30 PM Ruskin Actis, DPT [facility]   4/28/2021  3:30 PM Richad Nissen, PTA [facility]   5/4/2021  3:30 PM Ruskin Actis, DPT [facility]   5/6/2021  3:30 PM Ruskin Actis, DPT [facility]   5/11/2021  3:30 PM Steph Actis, DPT [facility]   5/13/2021  3:30 PM Steph Actis, DPT [facility]   5/17/2021  3:30 PM Richad Nissen, PTA [facility]   5/19/2021  3:30 PM Richad Nissen, PTA [facility]   5/25/2021  3:30 PM Ruskin Actis, DPT [facility]   5/27/2021  3:30 PM Stehp Actis, DPT [facility]

## 2021-04-22 ENCOUNTER — APPOINTMENT (OUTPATIENT)
Dept: PHYSICAL THERAPY | Age: 41
End: 2021-04-22
Payer: COMMERCIAL

## 2021-04-26 ENCOUNTER — APPOINTMENT (OUTPATIENT)
Dept: PHYSICAL THERAPY | Age: 41
End: 2021-04-26
Payer: COMMERCIAL

## 2021-04-26 ENCOUNTER — HOSPITAL ENCOUNTER (OUTPATIENT)
Dept: PHYSICAL THERAPY | Age: 41
Discharge: HOME | End: 2021-04-26
Payer: COMMERCIAL

## 2021-04-26 PROCEDURE — 97110 THERAPEUTIC EXERCISES: CPT

## 2021-04-26 PROCEDURE — 97016 VASOPNEUMATIC DEVICE THERAPY: CPT

## 2021-04-26 NOTE — PROGRESS NOTES
Ning Resendiz  : 1980  Primary: [facility]  Secondary:  Lizette Pearson @ [address], [place].  [phone]   [phone]      OUTPATIENT PHYSICAL THERAPY: Daily Treatment Note  2021   ICD-10: Treatment Diagnosis: Pain in right knee (M25.561), Stiffness of right knee, not elsewhere classified (M25.661), Pain in left knee (M25.562) and Stiffness of left knee, not elsewhere classified (M25.662) and Muscle weakness (generalized) (M62.81) and Other abnormalities of gait and mobility (R26.89)     L ACL allograft, B chondroplasty and meniscectomy 21    Effective Dates: 3/2/2021 TO 2021 (90 days). Frequency/Duration: 2-3 times a week for 90 Days  GOALS: (Goals have been discussed and agreed upon with patient.)  Short-Term Functional Goals: Time Frame: 4 weeks  1. Pt to report compliance with HEP MET  2. Pt to achieve 0-125 AROM B knees for normal function and mechanics MET RIGHT, PROGRESSING ON LEFT  3. Pt to demonstrate normal gait level surfaces without assistive device MET   4. Pt to jun progressive ex program without increased pain or swelling. MET  Discharge Goals: Time Frame: 16 weeks  1. Pt to increase strength for reciprocal gait on stairs ONGOING  2. Pt to increase SLS > 20 sec B for safe amb all surfaces ONGOING  3. Pt to resume working without limitation inc navigating stairs consistently ONGOING  4. Pt to resume walking dogs without limitations ONGOING  _______________________________________________________________________________  Pre-treatment Symptoms/Complaints:  Notes going around [employer] over the weekend without the brace. Used a cane for stability and security. Continues to be apprehensive about it buckling and losing balance. Notes some soreness around the anterior knees, but this is diminishing.    Pain: Initial: 4-5/10 B LEs Post Session: no increase    Medications Last Reviewed:  2021     MD follow up: April 15, 2021    Updated Objective Findings:      Observation/Orthostatic Postural Assessment:          Steri-strips intact R med and lat portals; dressing with tegaderm intact over L knee with thick black dried blood contained;  Wearing brace locked at 0 on L   Palpation:          Good patellar mobility B  ROM:         AROM  Knee flex-ext R 102-0, 83-0 L        Calf tightness B  Strength:         Good quad set R, fair quad set L;  Further strength testing deferred at today's visit  Functional Mobility:         Gait/Ambulation:  Pt leans on B axillary crutches with step-to gait pattern, brace at 0        Transfers:  Pt uses UE assist and R LE         Stairs:  Step-to pattern or avoids currrently  Balance:          Unable to assess at today's visit    Tool Used: Lower Extremity Functional Scale (LEFS)  Score:  Initial: 8/80 Most Recent: X/80 (Date: -- )   Interpretation of Score: 20 questions each scored on a 5 point scale with 0 representing \"extreme difficulty or unable to perform\" and 4 representing \"no difficulty\". The lower the score, the greater the functional disability. 80/80 represents no disability. Minimal detectable change is 9 points. UPDATE: 3/22/21  L AROM 0-100 °  R AROM 0-115 °    3/26/21 update:   Left knee: 1-0-110 °   Right knee: 1-0-120 °   Continues to ambulate with hinged brace unlocked 0-60 °. Uses crutch for community mobility. Knee extension: 3+/5 B   SLR: 3/5 L with continued extensor lag, 4/5 R    4/21/21 update  B knee extension is 0 °         TREATMENT:   THERAPEUTIC ACTIVITY: ( see below for minutes): Therapeutic activities per grid below to improve mobility. Required moderate verbal and manual cues to improve functional mobility . THERAPEUTIC EXERCISE: (see below for minutes):  Exercises per grid below to improve strength.   Required moderate verbal and manual cues to promote proper body alignment, promote proper body posture, promote proper body mechanics and promote proper body breathing techniques. Progressed resistance, range, repetitions and complexity of movement as indicated. MANUAL THERAPY: (see below for minutes): Joint mobilization and Soft tissue mobilization was utilized and necessary because of the patient's restricted joint motion, painful spasm, loss of articular motion and restricted motion of soft tissue. MODALITIES: (see below for minutes):      for pain modulation    AQUATIC THERAPY (see below for minutes): Aquatic treatment performed per flow grid for Decreased muscle strength, Decreased endurance, Decreased static/dynamic balance and reactive control, Decreased activity endurance, Decompression, Ease of movement and Low impact and reduced weight bearing activity. Date: 3/19/21 (visit 6) 3/22/21 (visit 7) 3/24/21 (visit 8) 3/26/21 (Visit 9) progress note 3/29/21 (visit 10) 4/1/21 (visit 11) 4/6/21 (visit 12) 4/12/21 (visit 13) 4/16/21 (visit 14) 4/19/21 (visit 15) 4/21/21 (visit 16) 4/26/21 (visit 17)    Modalities: 15 min  15 min 15 min  15 min  10 min  15 min 15 min 15 min  15 min  15 min  15 min 15 min     Game ready B knees 15 min  At medium compression 15 min  15 min  10 min  15 min Medium compression Med compression 15 min  15 min  15 min  Bilateral LEs 15 min                    Manual Therapy:                                                Therapeutic Exercises: 45 min  35 min 45 min  45 min  35 min  30 min 45 min 45 min  45 min  45 min  45 min 45 min     Pt education, postural education, HEP, functional breathing, patella mobs  Pt was educated on the signs and symptoms of blood clots/ DVT              Quad set B                SLR B 5 min with NMES 10sec on/off; SLR R 30x 5 min with NMES to L LE, 10 sec on/off.  3x10 R LE SLR 30x B 30x B SLR 30x B SLR 3x10 left 3x10 B 3x10 B   3x10 B Clamshell 3x10 B    gastroc stretch B       1 min hold on L1 and 3x10 heel raises 2x1 min  1 min calf stretch, 30x calf raise 2x1 min calf stretch L2, calf raises L1 3x10 1 min hold calf stretch and heel raises 3x10 2x1 min calf stretch, 3x10 calf raise    Gait mechanics level surfaces Walking march in II bars x 4 laps  Walking march through floor x 2 laps Walking marchx 2 laps, juliana walk over x2 laps fwd/x2 laps lateral Walking march  X 2 laps Walking march over hurdles x3 laps          Heel slides 20x with strap B; therapist assisted overpressure on L  3x10 B with strap Therapist assisted stretching into knee flexion Therapist assisted stretching into extension and flexion, hamstring stretch [id number] B Hamstring stretch [id number] B; overpressure into flexion L x30 heel slides on the L. B hamstring stretch 30 sec hold x 4 with strap. x30 heel slides on the left.  Hamstring stretch B 30 sec hold x 4  2x30 sec hamstring and ITB stretch B [id number] to hamstring, mobs to L into extension gr 3 10x x30 B with strap     FTKE                Hip abduction Lateral band walk green x3 laps in II bars  Lateral band walk green x 3 laps Lateral band walk green x 3 laps; band kick backs 3x10 Lateral band walk green x 3 laps Lateral band walk x 3 laps 5 laps in // bars black Lateral band walk green loop on open floor x3 laps, kick backs 30x Lateral band walk green x 3 laps, kick backs 3x10 Lateral band walk green x3 laps, kick backs 2x10 Lateral band walk 2 L blue CLX and kick backs 3x10 B Lateral band walk green loop x 3 laps, kick backs 30x    LAQ   5# 30x B 10# 30x B 90-30 °  10# 30x B 90-30 °   5# 2x10 B 90-30 °   10# 3x10 B 90 -30 °  10# 3x10 B 90-30 °  repeat  10# 4x10 B 90-30 °     Bio step 5 min L1 6 min L1 6 min L1 6 min L4 5 min L4 L3 5min 6 min L2 6 min L4 6 min L4 6 min L4 5 min L4 5 min L4    Step ups 4\" 10x B; 6\" 2x10 B 2x10B on 6 in step with UE support 3x10 B lateral to 6\" step up Lateral step up 6\" 30x  Forward focusing on terminal knee extension 2x10 ea B and lateral up and over 2x10, 4 in 2x10 ea focusing on terminal knee extension (forward and lateral) on 4 in  Lateral 6\" 3x8 B cueing quad and glute activation Lateral 6\" 3x10 B  Forward 2x10 B onto 6 in          Standing hamstring curl 10# to L only 30x     Bridge 3x10 Bridge 3x10 Bridge 3x10            Mini squat 3x10  3x10 Sit to stand from lo mat table 3x10 STS from chair with UE support 3x10 Reverse lunge in II bars to 2 airex 2x5 B                    HEP: see hand out    Med[employer] Portal  Treatment/Session Summary:    · Response to Treatment: Progressed to split squat lunge to improve strength at greater ranges of knee flexion and improve ability to get up and down from the floor. · Communication/Consultation:  None today  · Equipment provided today:  Hand out on blood clot signs and symptoms  · Recommendations/Intent for next treatment session: Next visit will focus on ROM, strengthening, pain and edema management as indicated.     Total Treatment Billable Duration:  60 mins  PT Patient Time In/Time Out  Time In: 3093  Time Out: [address], DPT    Future Appointments   Date Time Provider Tamara Brewer   4/28/2021  3:30 PM Sandra Magallon, [facility]   5/4/2021  3:30 PM Juanell Chimes, DPT [facility]   5/6/2021  3:30 PM Juanell Chimes, DPT [facility]   5/11/2021  3:30 PM Juanell Chimes, DPT [facility]   5/13/2021  3:30 PM Juanell Chimes, DPT [facility]   5/17/2021  2:45 PM Benjamin Velasco, PTA [facility]   5/19/2021  3:30 PM [place] [facility], PTA [facility]   5/25/2021  3:30 PM Juanell Chimes, DPT [facility]   5/27/2021  3:30 PM Juanell Chimes, DPT [facility]

## 2021-04-28 ENCOUNTER — HOSPITAL ENCOUNTER (OUTPATIENT)
Dept: PHYSICAL THERAPY | Age: 41
Discharge: HOME | End: 2021-04-28
Payer: COMMERCIAL

## 2021-04-28 ENCOUNTER — APPOINTMENT (OUTPATIENT)
Dept: PHYSICAL THERAPY | Age: 41
End: 2021-04-28
Payer: COMMERCIAL

## 2021-04-28 PROCEDURE — 97016 VASOPNEUMATIC DEVICE THERAPY: CPT

## 2021-04-28 PROCEDURE — 97110 THERAPEUTIC EXERCISES: CPT

## 2021-04-28 NOTE — PROGRESS NOTES
Farrukh Resendiz  : 1980  Primary: [facility]  Secondary:  [address] [facility] @ [address], [address] [place] [address]  [phone]   [phone]      OUTPATIENT PHYSICAL THERAPY: Daily Treatment Note  2021   ICD-10: Treatment Diagnosis: Pain in right knee (M25.561), Stiffness of right knee, not elsewhere classified (M25.661), Pain in left knee (M25.562) and Stiffness of left knee, not elsewhere classified (M25.662) and Muscle weakness (generalized) (M62.81) and Other abnormalities of gait and mobility (R26.89)     L ACL allograft, B chondroplasty and meniscectomy 21    Effective Dates: 3/2/2021 TO 2021 (90 days). Frequency/Duration: 2-3 times a week for 90 Days  GOALS: (Goals have been discussed and agreed upon with patient.)  Short-Term Functional Goals: Time Frame: 4 weeks  1. Pt to report compliance with HEP MET  2. Pt to achieve 0-125 AROM B knees for normal function and mechanics MET RIGHT, PROGRESSING ON LEFT  3. Pt to demonstrate normal gait level surfaces without assistive device MET   4. Pt to jun progressive ex program without increased pain or swelling. MET  Discharge Goals: Time Frame: 16 weeks  1. Pt to increase strength for reciprocal gait on stairs ONGOING  2. Pt to increase SLS > 20 sec B for safe amb all surfaces ONGOING  3. Pt to resume working without limitation inc navigating stairs consistently ONGOING  4. Pt to resume walking dogs without limitations ONGOING  _______________________________________________________________________________  Pre-treatment Symptoms/Complaints:  Pt states \"Both knees are sore today. I think it was the new exercises. \"  Pain: Initial: 610 B LEs Post Session: no increase    Medications Last Reviewed:  2021     MD follow up: April 15, 2021    Updated Objective Findings:      Observation/Orthostatic Postural Assessment:          Steri-strips intact R med and lat portals; dressing with tegaderm intact over L knee with thick black dried blood contained;  Wearing brace locked at 0 on L   Palpation:          Good patellar mobility B  ROM:         AROM  Knee flex-ext R 102-0, 83-0 L        Calf tightness B  Strength:         Good quad set R, fair quad set L;  Further strength testing deferred at today's visit  Functional Mobility:         Gait/Ambulation:  Pt leans on B axillary crutches with step-to gait pattern, brace at 0        Transfers:  Pt uses UE assist and R LE         Stairs:  Step-to pattern or avoids currrently  Balance:          Unable to assess at today's visit    Tool Used: Lower Extremity Functional Scale (LEFS)  Score:  Initial: 8/80 Most Recent: X/80 (Date: -- )   Interpretation of Score: 20 questions each scored on a 5 point scale with 0 representing \"extreme difficulty or unable to perform\" and 4 representing \"no difficulty\". The lower the score, the greater the functional disability. 80/80 represents no disability. Minimal detectable change is 9 points. UPDATE: 3/22/21  L AROM 0-100 °  R AROM 0-115 °    3/26/21 update:   Left knee: 1-0-110 °   Right knee: 1-0-120 °   Continues to ambulate with hinged brace unlocked 0-60 °. Uses crutch for community mobility. Knee extension: 3+/5 B   SLR: 3/5 L with continued extensor lag, 4/5 R    4/21/21 update  B knee extension is 0 °         TREATMENT:   THERAPEUTIC ACTIVITY: ( see below for minutes): Therapeutic activities per grid below to improve mobility. Required moderate verbal and manual cues to improve functional mobility . THERAPEUTIC EXERCISE: (see below for minutes):  Exercises per grid below to improve strength. Required moderate verbal and manual cues to promote proper body alignment, promote proper body posture, promote proper body mechanics and promote proper body breathing techniques. Progressed resistance, range, repetitions and complexity of movement as indicated.   MANUAL THERAPY: (see below for minutes): Joint mobilization and Soft tissue mobilization was utilized and necessary because of the patient's restricted joint motion, painful spasm, loss of articular motion and restricted motion of soft tissue. MODALITIES: (see below for minutes):      for pain modulation    AQUATIC THERAPY (see below for minutes): Aquatic treatment performed per flow grid for Decreased muscle strength, Decreased endurance, Decreased static/dynamic balance and reactive control, Decreased activity endurance, Decompression, Ease of movement and Low impact and reduced weight bearing activity.     Date: 3/26/21 (Visit 9) progress note 3/29/21 (visit 10) 4/1/21 (visit 11) 4/6/21 (visit 12) 4/12/21 (visit 13) 4/16/21 (visit 14) 4/19/21 (visit 15) 4/21/21 (visit 16)  4/26/21 (visit 17) 4/28/21 (visit 18)   Modalities: 15 min  10 min  15 min 15 min 15 min  15 min  15 min  15 min 15 min  15 min   Game ready B knees 15 min  10 min  15 min Medium compression Med compression 15 min  15 min  15 min  Bilateral LEs 15 min B LEs at medium compression                Manual Therapy:                                       Therapeutic Exercises: 45 min  35 min  30 min 45 min 45 min  45 min  45 min  45 min 45 min  45 min   Quad set B             SLR B 30x B SLR 30x B SLR 3x10 left 3x10 B 3x10 B   3x10 B Clamshell 3x10 B 3x10 B   gastroc stretch B    1 min hold on L1 and 3x10 heel raises 2x1 min  1 min calf stretch, 30x calf raise 2x1 min calf stretch L2, calf raises L1 3x10 1 min hold calf stretch and heel raises 3x10 2x1 min calf stretch, 3x10 calf raise 3x10 heel raises and 1 min hold on incline   Gait mechanics level surfaces Walking marchx 2 laps, juliana walk over x2 laps fwd/x2 laps lateral Walking march  X 2 laps Walking march over hurdles x3 laps          Heel slides Therapist assisted stretching into extension and flexion, hamstring stretch [id number] B Hamstring stretch [id number] B; overpressure into flexion L x30 heel slides on the L. B hamstring stretch 30 sec hold x 4 with strap. x30 heel slides on the left. Hamstring stretch B 30 sec hold x 4  2x30 sec hamstring and ITB stretch B [id number] to hamstring, mobs to L into extension gr 3 10x x30 B with strap  Hamstring stretch 30 sec hold x4 B   FTKE             Hip abduction Lateral band walk green x 3 laps; band kick backs 3x10 Lateral band walk green x 3 laps Lateral band walk x 3 laps 5 laps in // bars black Lateral band walk green loop on open floor x3 laps, kick backs 30x Lateral band walk green x 3 laps, kick backs 3x10 Lateral band walk green x3 laps, kick backs 2x10 Lateral band walk 2 L blue CLX and kick backs 3x10 B Lateral band walk green loop x 3 laps, kick backs 30x 2 laps thick green lateral band walk   LAQ 10# 30x B 90-30 °  10# 30x B 90-30 °   5# 2x10 B 90-30 °   10# 3x10 B 90 -30 °  10# 3x10 B 90-30 °  repeat  10# 4x10 B 90-30 °  2x10 B 90-30 °  10#   Bio step 6 min L4 5 min L4 L3 5min 6 min L2 6 min L4 6 min L4 6 min L4 5 min L4 5 min L4 5 min L4   Step ups Lateral step up 6\" 30x  Forward focusing on terminal knee extension 2x10 ea B and lateral up and over 2x10, 4 in 2x10 ea focusing on terminal knee extension (forward and lateral) on 4 in  Lateral 6\" 3x8 B cueing quad and glute activation Lateral 6\" 3x10 B  Forward 2x10 B onto 6 in       Standing hamstring curl 10# to L only 30x     Bridge 3x10 Bridge 3x10 Bridge 3x10 Bridge 3x10        Mini squat 3x10  3x10 Sit to stand from lo mat table 3x10 STS from chair with UE support 3x10 Reverse lunge in II bars to 2 airex 2x5 B Reverse lunge x10 B to pad and x10 B with slider                HEP: see hand out    [employer] Portal  Treatment/Session Summary:    · Response to Treatment: Pt had the most difficulty with split squats but she did not report increased pain. Continue POC.   · Communication/Consultation:  None today  · Equipment provided today:  Hand out on blood clot signs and symptoms  · Recommendations/Intent for next treatment session: Next visit will focus on ROM, strengthening, pain and edema management as indicated.     Total Treatment Billable Duration:  60 mins  PT Patient Time In/Time Out  Time In: 0330  Time Out: 8080 EVON Singleton PTA    Future Appointments   Date Time Provider Tamara Brewer   5/4/2021  3:30 PM Juanell Chimes, DPT [facility]   5/6/2021  3:30 PM Juanell Chimes, DPT [facility]   5/11/2021  3:30 PM Juanell Chimes, DPT [facility]   5/13/2021  3:30 PM Juanell Chimes, DPT [facility]   5/17/2021  2:45 PM Benjamin Zepedason, PTA [facility]   5/19/2021  3:30 PM Hoolehua Spine, PTA [facility]   5/25/2021  3:30 PM Juanell Chimes, DPT [facility]   5/27/2021  3:30 PM Juanell Chimes, DPT [facility]

## 2021-05-04 ENCOUNTER — HOSPITAL ENCOUNTER (OUTPATIENT)
Dept: PHYSICAL THERAPY | Age: 41
Discharge: HOME | End: 2021-05-04
Payer: COMMERCIAL

## 2021-05-04 PROCEDURE — 97110 THERAPEUTIC EXERCISES: CPT

## 2021-05-04 PROCEDURE — 97016 VASOPNEUMATIC DEVICE THERAPY: CPT

## 2021-05-04 NOTE — PROGRESS NOTES
Ambrose Resendiz  : 1980  Primary: [facility]  Secondary:  [address]  [phone]   [phone]      OUTPATIENT PHYSICAL THERAPY: Daily Treatment Note  2021   ICD-10: Treatment Diagnosis: Pain in right knee (M25.561), Stiffness of right knee, not elsewhere classified (M25.661), Pain in left knee (M25.562) and Stiffness of left knee, not elsewhere classified (M25.662) and Muscle weakness (generalized) (M62.81) and Other abnormalities of gait and mobility (R26.89)     L ACL allograft, B chondroplasty and meniscectomy 21    Effective Dates: 3/2/2021 TO 2021 (90 days). Frequency/Duration: 2-3 times a week for 90 Days  GOALS: (Goals have been discussed and agreed upon with patient.)  Short-Term Functional Goals: Time Frame: 4 weeks  1. Pt to report compliance with HEP MET  2. Pt to achieve 0-125 AROM B knees for normal function and mechanics MET RIGHT, PROGRESSING ON LEFT  3. Pt to demonstrate normal gait level surfaces without assistive device MET   4. Pt to jun progressive ex program without increased pain or swelling. MET  Discharge Goals: Time Frame: 16 weeks  1. Pt to increase strength for reciprocal gait on stairs ONGOING  2. Pt to increase SLS > 20 sec B for safe amb all surfaces ONGOING  3. Pt to resume working without limitation inc navigating stairs consistently ONGOING  4. Pt to resume walking dogs without limitations ONGOING  _______________________________________________________________________________  Pre-treatment Symptoms/Complaints:  Notes the knees have been a little more sore over the past week which she attributes to initiating lunges. Also notes some increase in swelling. She is somewhat concerned about returning to work. Reports feeling ok with being on her feet for 1-2 hours, but is not confident with being able to be on her feet for 8+ hours.     Pain: Initial: 6/10 B LEs    Date of surgery: 2/26/21;   MD follow up 5/25/21  Post Session: no increase    Medications Last Reviewed:  5/4/2021     MD follow up: April 15, 2021    Updated Objective Findings:      Observation/Orthostatic Postural Assessment:          Steri-strips intact R med and lat portals; dressing with tegaderm intact over L knee with thick black dried blood contained;  Wearing brace locked at 0 on L   Palpation:          Good patellar mobility B  ROM:         AROM  Knee flex-ext R 102-0, 83-0 L        Calf tightness B  Strength:         Good quad set R, fair quad set L;  Further strength testing deferred at today's visit  Functional Mobility:         Gait/Ambulation:  Pt leans on B axillary crutches with step-to gait pattern, brace at 0        Transfers:  Pt uses UE assist and R LE         Stairs:  Step-to pattern or avoids currrently  Balance:          Unable to assess at today's visit    Tool Used: Lower Extremity Functional Scale (LEFS)  Score:  Initial: 8/80 Most Recent: X/80 (Date: -- )   Interpretation of Score: 20 questions each scored on a 5 point scale with 0 representing \"extreme difficulty or unable to perform\" and 4 representing \"no difficulty\". The lower the score, the greater the functional disability. 80/80 represents no disability. Minimal detectable change is 9 points. UPDATE: 3/22/21  L AROM 0-100 °  R AROM 0-115 °    3/26/21 update:   Left knee: 1-0-110 °   Right knee: 1-0-120 °   Continues to ambulate with hinged brace unlocked 0-60 °. Uses crutch for community mobility. Knee extension: 3+/5 B   SLR: 3/5 L with continued extensor lag, 4/5 R    4/21/21 update  B knee extension is 0 °         TREATMENT:   THERAPEUTIC ACTIVITY: ( see below for minutes): Therapeutic activities per grid below to improve mobility. Required moderate verbal and manual cues to improve functional mobility . THERAPEUTIC EXERCISE: (see below for minutes):  Exercises per grid below to improve strength.   Required moderate verbal and manual cues to promote proper body alignment, promote proper body posture, promote proper body mechanics and promote proper body breathing techniques. Progressed resistance, range, repetitions and complexity of movement as indicated. MANUAL THERAPY: (see below for minutes): Joint mobilization and Soft tissue mobilization was utilized and necessary because of the patient's restricted joint motion, painful spasm, loss of articular motion and restricted motion of soft tissue. MODALITIES: (see below for minutes):      for pain modulation    AQUATIC THERAPY (see below for minutes): Aquatic treatment performed per flow grid for Decreased muscle strength, Decreased endurance, Decreased static/dynamic balance and reactive control, Decreased activity endurance, Decompression, Ease of movement and Low impact and reduced weight bearing activity.     Date: 3/26/21 (Visit 9) progress note 3/29/21 (visit 10) 4/1/21 (visit 11) 4/6/21 (visit 12) 4/12/21 (visit 13) 4/16/21 (visit 14) 4/19/21 (visit 15) 4/21/21 (visit 16)  Progress note 4/26/21 (visit 17) 4/28/21 (visit 18) 5/4/21 (visit 19)    Modalities: 15 min  10 min  15 min 15 min 15 min  15 min  15 min  15 min 15 min  15 min 15 min     Game ready B knees 15 min  10 min  15 min Medium compression Med compression 15 min  15 min  15 min  Bilateral LEs 15 min B LEs at medium compression B med compression                   Manual Therapy:                                             Therapeutic Exercises: 45 min  35 min  30 min 45 min 45 min  45 min  45 min  45 min 45 min  45 min 45 min     Quad set B               SLR B 30x B SLR 30x B SLR 3x10 left 3x10 B 3x10 B   3x10 B Clamshell 3x10 B 3x10 B 20x B     gastroc stretch B    1 min hold on L1 and 3x10 heel raises 2x1 min  1 min calf stretch, 30x calf raise 2x1 min calf stretch L2, calf raises L1 3x10 1 min hold calf stretch and heel raises 3x10 2x1 min calf stretch, 3x10 calf raise 3x10 heel raises and 1 min hold on incline 3x10 L1, L2 2x1 min calf stretch    Gait mechanics level surfaces Walking marchx 2 laps, juliana walk over x2 laps fwd/x2 laps lateral Walking march  X 2 laps Walking march over hurdles x3 laps            Heel slides Therapist assisted stretching into extension and flexion, hamstring stretch [id number] B Hamstring stretch [id number] B; overpressure into flexion L x30 heel slides on the L. B hamstring stretch 30 sec hold x 4 with strap. x30 heel slides on the left.  Hamstring stretch B 30 sec hold x 4  2x30 sec hamstring and ITB stretch B [id number] to hamstring, mobs to L into extension gr 3 10x x30 B with strap  Hamstring stretch 30 sec hold x4 B [id number] hamstring stretch B    FTKE               Hip abduction Lateral band walk green x 3 laps; band kick backs 3x10 Lateral band walk green x 3 laps Lateral band walk x 3 laps 5 laps in // bars black Lateral band walk green loop on open floor x3 laps, kick backs 30x Lateral band walk green x 3 laps, kick backs 3x10 Lateral band walk green x3 laps, kick backs 2x10 Lateral band walk 2 L blue CLX and kick backs 3x10 B Lateral band walk green loop x 3 laps, kick backs 30x 2 laps thick green lateral band walk x3 laps green loop; kick backs 3x10    LAQ 10# 30x B 90-30 °  10# 30x B 90-30 °   5# 2x10 B 90-30 °   10# 3x10 B 90 -30 °  10# 3x10 B 90-30 °  repeat  10# 4x10 B 90-30 °  2x10 B 90-30 °  10# 15# 90-30 ° 3x10 B    Bio step 6 min L4 5 min L4 L3 5min 6 min L2 6 min L4 6 min L4 6 min L4 5 min L4 5 min L4 5 min L4 5 min L4    Step ups Lateral step up 6\" 30x  Forward focusing on terminal knee extension 2x10 ea B and lateral up and over 2x10, 4 in 2x10 ea focusing on terminal knee extension (forward and lateral) on 4 in  Lateral 6\" 3x8 B cueing quad and glute activation Lateral 6\" 3x10 B  Forward 2x10 B onto 6 in   Lateral 10\" 3x10 B       Standing hamstring curl 10# to L only 30x     Bridge 3x10 Bridge 3x10 Bridge 3x10 Bridge 3x10 Bridge 3x10          Mini squat 3x10 3x10 Sit to stand from lo mat table 3x10 STS from chair with UE support 3x10 Reverse lunge in II bars to 2 airex 2x5 B Reverse lunge x10 B to pad and x10 B with slider                    HEP: see hand out    [employer] Portal  Treatment/Session Summary:    · Response to Treatment: with lateral step ups and lunge patterns she continues to have difficulty controlling dynamic knee valgus. Improving control will improve compressive forces at the patella and decrease pain. · Communication/Consultation:  None today  · Equipment provided today:  Hand out on blood clot signs and symptoms  · Recommendations/Intent for next treatment session: Next visit will focus on ROM, strengthening, pain and edema management as indicated.     Total Treatment Billable Duration:  60 mins  PT Patient Time In/Time Out  Time In: 7377  Time Out: [address], DPT    Future Appointments   Date Time Provider Tamara Brewer   5/6/2021  3:30 PM Marisol Watts, PABLO [facility]   5/11/2021  3:30 PM Marisol Watts DPT [facility]   5/13/2021  3:30 PM Marisol Watts DPT [facility]   5/17/2021  2:45 PM Lucy Chicas PTA [facility]   5/19/2021  3:30 [facility]   5/25/2021  3:30 PM Marisol Watts DPT [facility]   5/27/2021  3:30 PM Marisol Watts DPT [facility]

## 2021-05-06 ENCOUNTER — HOSPITAL ENCOUNTER (OUTPATIENT)
Dept: PHYSICAL THERAPY | Age: 41
Discharge: HOME | End: 2021-05-06
Payer: COMMERCIAL

## 2021-05-06 PROCEDURE — 97110 THERAPEUTIC EXERCISES: CPT

## 2021-05-06 PROCEDURE — 97016 VASOPNEUMATIC DEVICE THERAPY: CPT

## 2021-05-06 NOTE — PROGRESS NOTES
Gifty Resendiz  : 1980  Primary: [facility]  Secondary:  [facility] @ [address]  [phone]   [phone]      OUTPATIENT PHYSICAL THERAPY: Daily Treatment Note  2021   ICD-10: Treatment Diagnosis: Pain in right knee (M25.561), Stiffness of right knee, not elsewhere classified (M25.661), Pain in left knee (M25.562) and Stiffness of left knee, not elsewhere classified (M25.662) and Muscle weakness (generalized) (M62.81) and Other abnormalities of gait and mobility (R26.89)     L ACL allograft, B chondroplasty and meniscectomy 21    Effective Dates: 3/2/2021 TO 2021 (90 days). Frequency/Duration: 2-3 times a week for 90 Days  GOALS: (Goals have been discussed and agreed upon with patient.)  Short-Term Functional Goals: Time Frame: 4 weeks  1. Pt to report compliance with HEP MET  2. Pt to achieve 0-125 AROM B knees for normal function and mechanics MET RIGHT, PROGRESSING ON LEFT  3. Pt to demonstrate normal gait level surfaces without assistive device MET   4. Pt to jun progressive ex program without increased pain or swelling. MET  Discharge Goals: Time Frame: 16 weeks  1. Pt to increase strength for reciprocal gait on stairs ONGOING  2. Pt to increase SLS > 20 sec B for safe amb all surfaces ONGOING  3. Pt to resume working without limitation inc navigating stairs consistently ONGOING  4. Pt to resume walking dogs without limitations ONGOING  _______________________________________________________________________________  Pre-treatment Symptoms/Complaints:  Anterior knee pain continues to decrease. Still feels hesitant with discontinuing brace for community mobility.  .     Pain: Initial: 6/10 B LEs    Date of surgery: 21;   MD follow up 21  Post Session: no increase    Medications Last Reviewed:  2021     MD follow up: April 15, 2021    Updated Objective Findings: Observation/Orthostatic Postural Assessment:          Steri-strips intact R med and lat portals; dressing with tegaderm intact over L knee with thick black dried blood contained;  Wearing brace locked at 0 on L   Palpation:          Good patellar mobility B  ROM:         AROM  Knee flex-ext R 102-0, 83-0 L        Calf tightness B  Strength:         Good quad set R, fair quad set L;  Further strength testing deferred at today's visit  Functional Mobility:         Gait/Ambulation:  Pt leans on B axillary crutches with step-to gait pattern, brace at 0        Transfers:  Pt uses UE assist and R LE         Stairs:  Step-to pattern or avoids currrently  Balance:          Unable to assess at today's visit    Tool Used: Lower Extremity Functional Scale (LEFS)  Score:  Initial: 8/80 Most Recent: X/80 (Date: -- )   Interpretation of Score: 20 questions each scored on a 5 point scale with 0 representing \"extreme difficulty or unable to perform\" and 4 representing \"no difficulty\". The lower the score, the greater the functional disability. 80/80 represents no disability. Minimal detectable change is 9 points. UPDATE: 3/22/21  L AROM 0-100 °  R AROM 0-115 °    3/26/21 update:   Left knee: 1-0-110 °   Right knee: 1-0-120 °   Continues to ambulate with hinged brace unlocked 0-60 °. Uses crutch for community mobility. Knee extension: 3+/5 B   SLR: 3/5 L with continued extensor lag, 4/5 R    4/21/21 update  B knee extension is 0 °         TREATMENT:   THERAPEUTIC ACTIVITY: ( see below for minutes): Therapeutic activities per grid below to improve mobility. Required moderate verbal and manual cues to improve functional mobility . THERAPEUTIC EXERCISE: (see below for minutes):  Exercises per grid below to improve strength. Required moderate verbal and manual cues to promote proper body alignment, promote proper body posture, promote proper body mechanics and promote proper body breathing techniques.   Progressed resistance, range, repetitions and complexity of movement as indicated. MANUAL THERAPY: (see below for minutes): Joint mobilization and Soft tissue mobilization was utilized and necessary because of the patient's restricted joint motion, painful spasm, loss of articular motion and restricted motion of soft tissue. MODALITIES: (see below for minutes):      for pain modulation    AQUATIC THERAPY (see below for minutes): Aquatic treatment performed per flow grid for Decreased muscle strength, Decreased endurance, Decreased static/dynamic balance and reactive control, Decreased activity endurance, Decompression, Ease of movement and Low impact and reduced weight bearing activity.     Date: 3/26/21 (Visit 9) progress note 3/29/21 (visit 10) 4/1/21 (visit 11) 4/6/21 (visit 12) 4/12/21 (visit 13) 4/16/21 (visit 14) 4/19/21 (visit 15) 4/21/21 (visit 16)  Progress note 4/26/21 (visit 17) 4/28/21 (visit 18) 5/4/21 (visit 19) 5/6/21 (visit 20)     Modalities: 15 min  10 min  15 min 15 min 15 min  15 min  15 min  15 min 15 min  15 min 15 min  15 min      Game ready B knees 15 min  10 min  15 min Medium compression Med compression 15 min  15 min  15 min  Bilateral LEs 15 min B LEs at medium compression B med compression B med compression                      Manual Therapy:                                                   Therapeutic Exercises: 45 min  35 min  30 min 45 min 45 min  45 min  45 min  45 min 45 min  45 min 45 min  45 min      Quad set B                 SLR B 30x B SLR 30x B SLR 3x10 left 3x10 B 3x10 B   3x10 B Clamshell 3x10 B 3x10 B 20x B  20x B      gastroc stretch B    1 min hold on L1 and 3x10 heel raises 2x1 min  1 min calf stretch, 30x calf raise 2x1 min calf stretch L2, calf raises L1 3x10 1 min hold calf stretch and heel raises 3x10 2x1 min calf stretch, 3x10 calf raise 3x10 heel raises and 1 min hold on incline 3x10 L1, L2 2x1 min calf stretch      Gait mechanics level surfaces Walking marchx 2 laps, juliana walk over x2 laps fwd/x2 laps lateral Walking march  X 2 laps Walking march over hurdles x3 laps              Heel slides Therapist assisted stretching into extension and flexion, hamstring stretch [id number] B Hamstring stretch [id number] B; overpressure into flexion L x30 heel slides on the L. B hamstring stretch 30 sec hold x 4 with strap. x30 heel slides on the left.  Hamstring stretch B 30 sec hold x 4  2x30 sec hamstring and ITB stretch B [id number] to hamstring, mobs to L into extension gr 3 10x x30 B with strap  Hamstring stretch 30 sec hold x4 B [id number] hamstring stretch B [id number] hamstring stretch B      FTKE                 Hip abduction Lateral band walk green x 3 laps; band kick backs 3x10 Lateral band walk green x 3 laps Lateral band walk x 3 laps 5 laps in // bars black Lateral band walk green loop on open floor x3 laps, kick backs 30x Lateral band walk green x 3 laps, kick backs 3x10 Lateral band walk green x3 laps, kick backs 2x10 Lateral band walk 2 L blue CLX and kick backs 3x10 B Lateral band walk green loop x 3 laps, kick backs 30x 2 laps thick green lateral band walk x3 laps green loop; kick backs 3x10      LAQ 10# 30x B 90-30 °  10# 30x B 90-30 °   5# 2x10 B 90-30 °   10# 3x10 B 90 -30 °  10# 3x10 B 90-30 °  repeat  10# 4x10 B 90-30 °  2x10 B 90-30 °  10# 15# 90-30 ° 3x10 B      Bio step 6 min L4 5 min L4 L3 5min 6 min L2 6 min L4 6 min L4 6 min L4 5 min L4 5 min L4 5 min L4 5 min L4 5 min L4     Step ups Lateral step up 6\" 30x  Forward focusing on terminal knee extension 2x10 ea B and lateral up and over 2x10, 4 in 2x10 ea focusing on terminal knee extension (forward and lateral) on 4 in  Lateral 6\" 3x8 B cueing quad and glute activation Lateral 6\" 3x10 B  Forward 2x10 B onto 6 in   Lateral 10\" 3x10 B         Standing hamstring curl 10# to L only 30x     Bridge 3x10 Bridge 3x10 Bridge 3x10 Bridge 3x10 Bridge 3x10  Bridge 3x10           Mini squat 3x10  3x10 Sit to stand from lo mat table 3x10 STS from chair with UE support 3x10 Reverse lunge in II bars to 2 airex 2x5 B Reverse lunge x10 B to pad and x10 B with slider                        HEP: see hand out    Med[employer] Portal  Treatment/Session Summary:    · Response to Treatment: with lateral step ups and lunge patterns she continues to have difficulty controlling dynamic knee valgus. Improving control will improve compressive forces at the patella and decrease pain. · Communication/Consultation:  None today  · Equipment provided today:  Hand out on blood clot signs and symptoms  · Recommendations/Intent for next treatment session: Next visit will focus on ROM, strengthening, pain and edema management as indicated.     Total Treatment Billable Duration:  60 mins  PT Patient Time In/Time Out  Time In: 0945  Time Out: [address], DPT    Future Appointments   Date Time Provider Tamara Brewer   5/11/2021  3:30 PM Carlene Power, [facility]   5/13/2021  3:30 PM Carlene Power, [facility]   5/17/2021  2:45 PM Gerald Haddad, PTA [facility]   5/19/2021  3:30 PM Zoey Ashby, PTA [facility]   5/25/2021  3:30 PM Carlene Power, [facility]   5/27/2021  3:30 PM Carlene Power, [facility]

## 2021-05-11 ENCOUNTER — HOSPITAL ENCOUNTER (OUTPATIENT)
Dept: PHYSICAL THERAPY | Age: 41
Discharge: HOME | End: 2021-05-11
Payer: COMMERCIAL

## 2021-05-11 PROCEDURE — 97110 THERAPEUTIC EXERCISES: CPT

## 2021-05-11 PROCEDURE — 97016 VASOPNEUMATIC DEVICE THERAPY: CPT

## 2021-05-11 NOTE — PROGRESS NOTES
Hoang Resendiz  : 1980  Primary: [facility]  Secondary:  [address] [facility] @ [address] [place] [address]  [phone]   [phone]      OUTPATIENT PHYSICAL THERAPY: Daily Treatment Note  2021   ICD-10: Treatment Diagnosis: Pain in right knee (M25.561), Stiffness of right knee, not elsewhere classified (M25.661), Pain in left knee (M25.562) and Stiffness of left knee, not elsewhere classified (M25.662) and Muscle weakness (generalized) (M62.81) and Other abnormalities of gait and mobility (R26.89)     L ACL allograft, B chondroplasty and meniscectomy 21    Effective Dates: 3/2/2021 TO 2021 (90 days). Frequency/Duration: 2-3 times a week for 90 Days  GOALS: (Goals have been discussed and agreed upon with patient.)  Short-Term Functional Goals: Time Frame: 4 weeks  1. Pt to report compliance with HEP MET  2. Pt to achieve 0-125 AROM B knees for normal function and mechanics MET RIGHT, PROGRESSING ON LEFT  3. Pt to demonstrate normal gait level surfaces without assistive device MET   4. Pt to jun progressive ex program without increased pain or swelling. MET  Discharge Goals: Time Frame: 16 weeks  1. Pt to increase strength for reciprocal gait on stairs ONGOING  2. Pt to increase SLS > 20 sec B for safe amb all surfaces ONGOING  3. Pt to resume working without limitation inc navigating stairs consistently ONGOING  4. Pt to resume walking dogs without limitations ONGOING  _______________________________________________________________________________  Pre-treatment Symptoms/Complaints:  Notes she was able to [place] walking [place] over the weekend. Experienced some soreness after, but increased her walking.     Pain: Initial: 10 B LEs    Date of surgery: 21;   MD follow up 21  Post Session: no increase    Medications Last Reviewed:  2021     MD follow up: April 15, 2021    Updated Objective Findings: Observation/Orthostatic Postural Assessment:          Steri-strips intact R med and lat portals; dressing with tegaderm intact over L knee with thick black dried blood contained;  Wearing brace locked at 0 on L   Palpation:          Good patellar mobility B  ROM:         AROM  Knee flex-ext R 102-0, 83-0 L        Calf tightness B  Strength:         Good quad set R, fair quad set L;  Further strength testing deferred at today's visit  Functional Mobility:         Gait/Ambulation:  Pt leans on B axillary crutches with step-to gait pattern, brace at 0        Transfers:  Pt uses UE assist and R LE         Stairs:  Step-to pattern or avoids currrently  Balance:          Unable to assess at today's visit    Tool Used: Lower Extremity Functional Scale (LEFS)  Score:  Initial: 8/80 Most Recent: X/80 (Date: -- )   Interpretation of Score: 20 questions each scored on a 5 point scale with 0 representing \"extreme difficulty or unable to perform\" and 4 representing \"no difficulty\". The lower the score, the greater the functional disability. 80/80 represents no disability. Minimal detectable change is 9 points. UPDATE: 3/22/21  L AROM 0-100 °  R AROM 0-115 °    3/26/21 update:   Left knee: 1-0-110 °   Right knee: 1-0-120 °   Continues to ambulate with hinged brace unlocked 0-60 °. Uses crutch for community mobility. Knee extension: 3+/5 B   SLR: 3/5 L with continued extensor lag, 4/5 R    4/21/21 update  B knee extension is 0 °         TREATMENT:   THERAPEUTIC ACTIVITY: ( see below for minutes): Therapeutic activities per grid below to improve mobility. Required moderate verbal and manual cues to improve functional mobility . THERAPEUTIC EXERCISE: (see below for minutes):  Exercises per grid below to improve strength. Required moderate verbal and manual cues to promote proper body alignment, promote proper body posture, promote proper body mechanics and promote proper body breathing techniques.   Progressed resistance, range, repetitions and complexity of movement as indicated. MANUAL THERAPY: (see below for minutes): Joint mobilization and Soft tissue mobilization was utilized and necessary because of the patient's restricted joint motion, painful spasm, loss of articular motion and restricted motion of soft tissue. MODALITIES: (see below for minutes):      for pain modulation    AQUATIC THERAPY (see below for minutes): Aquatic treatment performed per flow grid for Decreased muscle strength, Decreased endurance, Decreased static/dynamic balance and reactive control, Decreased activity endurance, Decompression, Ease of movement and Low impact and reduced weight bearing activity.     Date: 4/12/21 (visit 13) 4/16/21 (visit 14) 4/19/21 (visit 15) 4/21/21 (visit 16)  Progress note 4/26/21 (visit 17) 4/28/21 (visit 18) 5/4/21 (visit 19) 5/11/21 (visit 20)      Modalities: 15 min  15 min  15 min  15 min 15 min  15 min 15 min  15 min       Game ready B knees Med compression 15 min  15 min  15 min  Bilateral LEs 15 min B LEs at medium compression B med compression B med compression                    Manual Therapy:                                          Therapeutic Exercises: 45 min  45 min  45 min  45 min 45 min  45 min 45 min  45 min       Quad set B              SLR B 3x10 B   3x10 B Clamshell 3x10 B 3x10 B 20x B  20x B      gastroc stretch B 2x1 min  1 min calf stretch, 30x calf raise 2x1 min calf stretch L2, calf raises L1 3x10 1 min hold calf stretch and heel raises 3x10 2x1 min calf stretch, 3x10 calf raise 3x10 heel raises and 1 min hold on incline 3x10 L1, L2 2x1 min calf stretch 2x1 min L2 calf stretch, 3x10 L1 calf raise      Gait mechanics level surfaces              Heel slides  2x30 sec hamstring and ITB stretch B [id number] to hamstring, mobs to L into extension gr 3 10x x30 B with strap  Hamstring stretch 30 sec hold x4 B [id number] hamstring stretch B [id number] to hamstring, ITB       FTKE              Hip abduction Lateral band walk green loop on open floor x3 laps, kick backs 30x Lateral band walk green x 3 laps, kick backs 3x10 Lateral band walk green x3 laps, kick backs 2x10 Lateral band walk 2 L blue CLX and kick backs 3x10 B Lateral band walk green loop x 3 laps, kick backs 30x 2 laps thick green lateral band walk x3 laps green loop; kick backs 3x10 x3 laps green, kick backs 3x10      LAQ 10# 3x10 B 90 -30 °  10# 3x10 B 90-30 °  repeat  10# 4x10 B 90-30 °  2x10 B 90-30 °  10# 15# 90-30 ° 3x10 B 15# 90-30 ° 3x10 B      Bio step 6 min L4 6 min L4 6 min L4 5 min L4 5 min L4 5 min L4 5 min L4 5 min L4      Step ups  Lateral 6\" 3x8 B cueing quad and glute activation Lateral 6\" 3x10 B  Forward 2x10 B onto 6 in   Lateral 10\" 3x10 B  Forward 10\" 3x10 B         Bridge 3x10 Bridge 3x10 Bridge 3x10 Bridge 3x10 Bridge 3x10  Bridge 3x10       Mini squat 3x10  3x10 Sit to stand from lo mat table 3x10 STS from chair with UE support 3x10 Reverse lunge in II bars to 2 airex 2x5 B Reverse lunge x10 B to pad and x10 B with slider  Single leg deadlift with reach to table 2x8                    HEP: see hand out    Med[employer] Portal  Treatment/Session Summary:    · Response to Treatment: Progressing with load on weight bearing activities. Demonstrates good stability to progress to single leg deadlift. · Communication/Consultation:  None today  · Equipment provided today:  Hand out on blood clot signs and symptoms  · Recommendations/Intent for next treatment session: Next visit will focus on ROM, strengthening, pain and edema management as indicated.     Total Treatment Billable Duration:  60 mins  PT Patient Time In/Time Out  Time In: 4917  Time Out: [address], DPT    Future Appointments   Date Time Provider Tamara Brewer   5/13/2021  3:30 PM Carla Don DPT [facility]   5/17/2021  2:45 PM Juan C Blandon PTA [facility]   5/19/2021  3:30 PM [facility]ázquez [facility] 5/25/2021  3:30 PM PABLO Ness [facility]   5/27/2021  3:30 PM [facility]

## 2021-05-13 ENCOUNTER — HOSPITAL ENCOUNTER (OUTPATIENT)
Dept: PHYSICAL THERAPY | Age: 41
Discharge: HOME | End: 2021-05-13
Payer: COMMERCIAL

## 2021-05-13 PROCEDURE — 97016 VASOPNEUMATIC DEVICE THERAPY: CPT

## 2021-05-13 PROCEDURE — 97110 THERAPEUTIC EXERCISES: CPT

## 2021-05-13 NOTE — PROGRESS NOTES
Rob Resendiz  : 1980  Primary: [facility]  Secondary:  [address] [facility] @ [address], [address] [place] [address]  [phone]   [phone]      OUTPATIENT PHYSICAL THERAPY: Daily Treatment Note  2021   ICD-10: Treatment Diagnosis: Pain in right knee (M25.561), Stiffness of right knee, not elsewhere classified (M25.661), Pain in left knee (M25.562) and Stiffness of left knee, not elsewhere classified (M25.662) and Muscle weakness (generalized) (M62.81) and Other abnormalities of gait and mobility (R26.89)     L ACL allograft, B chondroplasty and meniscectomy 21    Effective Dates: 3/2/2021 TO 2021 (90 days). Frequency/Duration: 2-3 times a week for 90 Days  GOALS: (Goals have been discussed and agreed upon with patient.)  Short-Term Functional Goals: Time Frame: 4 weeks  1. Pt to report compliance with HEP MET  2. Pt to achieve 0-125 AROM B knees for normal function and mechanics MET RIGHT, PROGRESSING ON LEFT  3. Pt to demonstrate normal gait level surfaces without assistive device MET   4. Pt to jun progressive ex program without increased pain or swelling. MET  Discharge Goals: Time Frame: 16 weeks  1. Pt to increase strength for reciprocal gait on stairs ONGOING  2. Pt to increase SLS > 20 sec B for safe amb all surfaces ONGOING  3. Pt to resume working without limitation inc navigating stairs consistently ONGOING  4. Pt to resume walking dogs without limitations ONGOING  _______________________________________________________________________________  Pre-treatment Symptoms/Complaints:  Continues to report mild soreness through the anterior knees which she attributes to increased volume of walking and step up exercises.      Pain: Initial: 10 B LEs    Date of surgery: 21;   MD follow up 21  Post Session: no increase    Medications Last Reviewed:  2021     MD follow up: April 15, 2021    Updated Objective Findings:      Observation/Orthostatic Postural Assessment:          Steri-strips intact R med and lat portals; dressing with tegaderm intact over L knee with thick black dried blood contained;  Wearing brace locked at 0 on L   Palpation:          Good patellar mobility B  ROM:         AROM  Knee flex-ext R 102-0, 83-0 L        Calf tightness B  Strength:         Good quad set R, fair quad set L;  Further strength testing deferred at today's visit  Functional Mobility:         Gait/Ambulation:  Pt leans on B axillary crutches with step-to gait pattern, brace at 0        Transfers:  Pt uses UE assist and R LE         Stairs:  Step-to pattern or avoids currrently  Balance:          Unable to assess at today's visit    Tool Used: Lower Extremity Functional Scale (LEFS)  Score:  Initial: 8/80 Most Recent: X/80 (Date: -- )   Interpretation of Score: 20 questions each scored on a 5 point scale with 0 representing \"extreme difficulty or unable to perform\" and 4 representing \"no difficulty\". The lower the score, the greater the functional disability. 80/80 represents no disability. Minimal detectable change is 9 points. UPDATE: 3/22/21  L AROM 0-100 °  R AROM 0-115 °    3/26/21 update:   Left knee: 1-0-110 °   Right knee: 1-0-120 °   Continues to ambulate with hinged brace unlocked 0-60 °. Uses crutch for community mobility. Knee extension: 3+/5 B   SLR: 3/5 L with continued extensor lag, 4/5 R    4/21/21 update  B knee extension is 0 °         TREATMENT:   THERAPEUTIC ACTIVITY: ( see below for minutes): Therapeutic activities per grid below to improve mobility. Required moderate verbal and manual cues to improve functional mobility . THERAPEUTIC EXERCISE: (see below for minutes):  Exercises per grid below to improve strength. Required moderate verbal and manual cues to promote proper body alignment, promote proper body posture, promote proper body mechanics and promote proper body breathing techniques. Progressed resistance, range, repetitions and complexity of movement as indicated. MANUAL THERAPY: (see below for minutes): Joint mobilization and Soft tissue mobilization was utilized and necessary because of the patient's restricted joint motion, painful spasm, loss of articular motion and restricted motion of soft tissue. MODALITIES: (see below for minutes):      for pain modulation    AQUATIC THERAPY (see below for minutes): Aquatic treatment performed per flow grid for Decreased muscle strength, Decreased endurance, Decreased static/dynamic balance and reactive control, Decreased activity endurance, Decompression, Ease of movement and Low impact and reduced weight bearing activity.     Date: 4/12/21 (visit 13) 4/16/21 (visit 14) 4/19/21 (visit 15) 4/21/21 (visit 16)  Progress note 4/26/21 (visit 17) 4/28/21 (visit 18) 5/4/21 (visit 19) 5/11/21 (visit 20) 5/13/21 (visit 21)     Modalities: 15 min  15 min  15 min  15 min 15 min  15 min 15 min  15 min  15 min      Game ready B knees Med compression 15 min  15 min  15 min  Bilateral LEs 15 min B LEs at medium compression B med compression B med compression B med compression                   Manual Therapy:                                          Therapeutic Exercises: 45 min  45 min  45 min  45 min 45 min  45 min 45 min  45 min  45 min      Quad set B              SLR B 3x10 B   3x10 B Clamshell 3x10 B 3x10 B 20x B  20x B 20x B      gastroc stretch B 2x1 min  1 min calf stretch, 30x calf raise 2x1 min calf stretch L2, calf raises L1 3x10 1 min hold calf stretch and heel raises 3x10 2x1 min calf stretch, 3x10 calf raise 3x10 heel raises and 1 min hold on incline 3x10 L1, L2 2x1 min calf stretch 2x1 min L2 calf stretch, 3x10 L1 calf raise 2x1 min L2 calf stretch, 3x10 L1 calf raise     Gait mechanics level surfaces              Heel slides  2x30 sec hamstring and ITB stretch B [id number] to hamstring, mobs to L into extension gr 3 10x x30 B with strap  Hamstring stretch 30 sec hold x4 B [id number] hamstring stretch B [id number] to hamstring, ITB  [id number] to hamstring, ITB     FTKE              Hip abduction Lateral band walk green loop on open floor x3 laps, kick backs 30x Lateral band walk green x 3 laps, kick backs 3x10 Lateral band walk green x3 laps, kick backs 2x10 Lateral band walk 2 L blue CLX and kick backs 3x10 B Lateral band walk green loop x 3 laps, kick backs 30x 2 laps thick green lateral band walk x3 laps green loop; kick backs 3x10 x3 laps green, kick backs 3x10 x3 laps green, kick backs 3x10     LAQ 10# 3x10 B 90 -30 °  10# 3x10 B 90-30 °  repeat  10# 4x10 B 90-30 °  2x10 B 90-30 °  10# 15# 90-30 ° 3x10 B 15# 90-30 ° 3x10 B 15# 90-30 ° 3x10 B     Bio step 6 min L4 6 min L4 6 min L4 5 min L4 5 min L4 5 min L4 5 min L4 5 min L4 5 min L4     Step ups  Lateral 6\" 3x8 B cueing quad and glute activation Lateral 6\" 3x10 B  Forward 2x10 B onto 6 in   Lateral 10\" 3x10 B  Forward 10\" 3x10 B Forward 10\" cueing quad and glute activation 2x10 B        Bridge 3x10 Bridge 3x10 Bridge 3x10 Bridge 3x10 Bridge 3x10  Bridge 3x10 Bridge 3x10      Mini squat 3x10  3x10 Sit to stand from lo mat table 3x10 STS from chair with UE support 3x10 Reverse lunge in II bars to 2 airex 2x5 B Reverse lunge x10 B to pad and x10 B with slider  Single leg deadlift with reach to table 2x8                    HEP: see hand out    [facility] Portal  Treatment/Session Summary:    · Response to Treatment: Remains apprehensive with discontinuing brace for community mobility. Quad strength is gradually progressing. · Communication/Consultation:  None today  · Equipment provided today:  Hand out on blood clot signs and symptoms  · Recommendations/Intent for next treatment session: Next visit will focus on ROM, strengthening, pain and edema management as indicated.     Total Treatment Billable Duration:  60 mins  PT Patient Time In/Time Out  Time In: 1530  Time Out: [address], DPT    Future Appointments   Date Time Provider Tamara Daniella   5/17/2021  2:45 PM Arabella Rodríguez, [facility]   5/19/2021  3:30 PM Jonel Francois, TIGRE [facility]   5/25/2021  3:30 PM Rosangela Capps, SONIAT [facility]   5/27/2021  3:30 PM Rosangela Capps DPT [facility]

## 2021-05-14 NOTE — PROGRESS NOTES
Berna Bae   (:1980) [address] [facility]  [facility], [address] [place] [address]  [phone]   [phone]           PHYSICIAN COMMUNICATION and Progress Note    REFERRING PHYSICIAN: Delano Ly MD  MEDICAL/REFERRING DIAGNOSIS:  · Pain in right knee [M25.561]  · Pain in left knee [M25.562]  ATTENDANCE: Berna Bae has attended 21 out of 21 visits, with 0 cancellation(s) and 0 no shows. ASSESSMENT:  DATE: 2021    PROGRESS: Berna Bae has been progressing well, but slowly, with therapy. She remains apprehensive about discontinuing use of the brace for community mobility but has discontinued use around the home and is weaning with community activities. She has good range of motion at the knees. She continues to have weakness of the quadriceps bilaterally, performing leg extensions with 10#. She continues to have discomfort at the anterior knees bilaterally due to continued surgical recovery and residual quadriceps weakness. This is improving as her strength progresses. RECOMMENDATIONS: Continue therapy 2x/week for 6 weeks to further improve strength required for work that involves standing 8 hours/day.      Thank you for this referral,  Rhona Adams DPT

## 2021-05-17 ENCOUNTER — APPOINTMENT (OUTPATIENT)
Dept: PHYSICAL THERAPY | Age: 41
End: 2021-05-17
Payer: COMMERCIAL

## 2021-05-19 ENCOUNTER — HOSPITAL ENCOUNTER (OUTPATIENT)
Dept: PHYSICAL THERAPY | Age: 41
Discharge: HOME | End: 2021-05-19
Payer: COMMERCIAL

## 2021-05-19 PROCEDURE — 97110 THERAPEUTIC EXERCISES: CPT

## 2021-05-19 PROCEDURE — 97016 VASOPNEUMATIC DEVICE THERAPY: CPT

## 2021-05-19 NOTE — PROGRESS NOTES
Berna Bae   (:1980) [place] [address].  [phone]   [phone]           PHYSICIAN COMMUNICATION    REFERRING PHYSICIAN: Delano Ly MD  Return Physician Appointment: 21  MEDICAL/REFERRING DIAGNOSIS:  · Pain in right knee [M25.561]  · Pain in left knee [M25.562]  ATTENDANCE: Berna Bae has attended 22 out of 22 visits, with 2 cancellation(s) and 0 no shows. ASSESSMENT:  DATE: 2021    PROGRESS: Berna Bae is slowly making progress towards goals. Pt states \"The left knee locks up on me when I walk around without the brace. It's very painful. \"  Pt continues to have increased pain in the L knee without the brace. R knee AROM 1-0-125 °  L knee AROM  0-116 ° (limited flexion due to pain)      MMT tested in sitting  R knee extension 5/5  R knee flexion 4/5    L knee extension 5/5  L knee flexion 4+/5      RECOMMENDATIONS: Continue current POC, 4-6 weeks for 2 times a week.     Thank you for this referral,  Shaylee Pack PTA     Referring Physician Signature: Delano Ly MD          Date

## 2021-05-19 NOTE — PROGRESS NOTES
Titus Resendiz  : 1980  Primary: [facility]  Secondary:  [address] [facility] @ [address] [place] [address], [address] [place] [address]  [phone]   [phone]      OUTPATIENT PHYSICAL THERAPY: Daily Treatment Note and Progress Note 2021   ICD-10: Treatment Diagnosis: Pain in right knee (M25.561), Stiffness of right knee, not elsewhere classified (M25.661), Pain in left knee (M25.562) and Stiffness of left knee, not elsewhere classified (M25.662) and Muscle weakness (generalized) (M62.81) and Other abnormalities of gait and mobility (R26.89)     L ACL allograft, B chondroplasty and meniscectomy 21    Effective Dates: 3/2/2021 TO 2021 (90 days). Frequency/Duration: 2-3 times a week for 90 Days  GOALS: (Goals have been discussed and agreed upon with patient.)  Short-Term Functional Goals: Time Frame: 4 weeks  1. Pt to report compliance with HEP MET  2. Pt to achieve 0-125 AROM B knees for normal function and mechanics MET RIGHT, PROGRESSING ON LEFT  3. Pt to demonstrate normal gait level surfaces without assistive device MET   4. Pt to jun progressive ex program without increased pain or swelling. MET  Discharge Goals: Time Frame: 16 weeks  1. Pt to increase strength for reciprocal gait on stairs ONGOING  2. Pt to increase SLS > 20 sec B for safe amb all surfaces ONGOING  3. Pt to resume working without limitation inc navigating stairs consistently ONGOING  4. Pt to resume walking dogs without limitations ONGOING  _______________________________________________________________________________  Pre-treatment Symptoms/Complaints:  Pt reports a painful episode of the L knee locking when she was ambulating without the brace.   Pain: Initial: 5/10 B LEs    Date of surgery: 21;   MD follow up 21  Post Session: no increase    Medications Last Reviewed:  2021     MD follow up: April 15, 2021    Updated Objective Findings: Observation/Orthostatic Postural Assessment:          Steri-strips intact R med and lat portals; dressing with tegaderm intact over L knee with thick black dried blood contained;  Wearing brace locked at 0 on L   Palpation:          Good patellar mobility B  ROM:         AROM  Knee flex-ext R 102-0, 83-0 L        Calf tightness B  Strength:         Good quad set R, fair quad set L;  Further strength testing deferred at today's visit  Functional Mobility:         Gait/Ambulation:  Pt leans on B axillary crutches with step-to gait pattern, brace at 0        Transfers:  Pt uses UE assist and R LE         Stairs:  Step-to pattern or avoids currrently  Balance:          Unable to assess at today's visit    Tool Used: Lower Extremity Functional Scale (LEFS)  Score:  Initial: 8/80 Most Recent: X/80 (Date: -- )   Interpretation of Score: 20 questions each scored on a 5 point scale with 0 representing \"extreme difficulty or unable to perform\" and 4 representing \"no difficulty\". The lower the score, the greater the functional disability. 80/80 represents no disability. Minimal detectable change is 9 points. UPDATE: 3/22/21  L AROM 0-100 °  R AROM 0-115 °    3/26/21 update:   Left knee: 1-0-110 °   Right knee: 1-0-120 °   Continues to ambulate with hinged brace unlocked 0-60 °. Uses crutch for community mobility. Knee extension: 3+/5 B   SLR: 3/5 L with continued extensor lag, 4/5 R    4/21/21 update  B knee extension is 0 °    5/19/21 Update:   R knee AROM 1-0-125 °  L knee AROM 0-116 ° (painful)    MMT tested in sitting   R knee extension 5/5  R knee flexion 4/5    L knee extension 5/5  L knee flexion 4+/5         TREATMENT:   THERAPEUTIC ACTIVITY: ( see below for minutes): Therapeutic activities per grid below to improve mobility. Required moderate verbal and manual cues to improve functional mobility . THERAPEUTIC EXERCISE: (see below for minutes):  Exercises per grid below to improve strength.   Required moderate verbal and manual cues to promote proper body alignment, promote proper body posture, promote proper body mechanics and promote proper body breathing techniques. Progressed resistance, range, repetitions and complexity of movement as indicated. MANUAL THERAPY: (see below for minutes): Joint mobilization and Soft tissue mobilization was utilized and necessary because of the patient's restricted joint motion, painful spasm, loss of articular motion and restricted motion of soft tissue. MODALITIES: (see below for minutes):      for pain modulation    AQUATIC THERAPY (see below for minutes): Aquatic treatment performed per flow grid for Decreased muscle strength, Decreased endurance, Decreased static/dynamic balance and reactive control, Decreased activity endurance, Decompression, Ease of movement and Low impact and reduced weight bearing activity.     Date: 4/12/21 (visit 13) 4/16/21 (visit 14) 4/19/21 (visit 15) 4/21/21 (visit 16)  Progress note 4/26/21 (visit 17) 4/28/21 (visit 18) 5/4/21 (visit 19) 5/11/21 (visit 20) 5/13/21 (visit 21) 5/19/21 (visit 22) PN    Modalities: 15 min  15 min  15 min  15 min 15 min  15 min 15 min  15 min  15 min  15 min    Game ready B knees Med compression 15 min  15 min  15 min  Bilateral LEs 15 min B LEs at medium compression B med compression B med compression B med compression To B knees with medium compression                  Manual Therapy:                                          Therapeutic Exercises: 45 min  45 min  45 min  45 min 45 min  45 min 45 min  45 min  45 min  45 min    Quad set B              SLR B 3x10 B   3x10 B Clamshell 3x10 B 3x10 B 20x B  20x B 20x B  2x10 B LE    gastroc stretch B 2x1 min  1 min calf stretch, 30x calf raise 2x1 min calf stretch L2, calf raises L1 3x10 1 min hold calf stretch and heel raises 3x10 2x1 min calf stretch, 3x10 calf raise 3x10 heel raises and 1 min hold on incline 3x10 L1, L2 2x1 min calf stretch 2x1 min L2 calf stretch, 3x10 L1 calf raise 2x1 min L2 calf stretch, 3x10 L1 calf raise 1 min hold on L2 and heel raises x30    Gait mechanics level surfaces              Heel slides  2x30 sec hamstring and ITB stretch B [id number] to hamstring, mobs to L into extension gr 3 10x x30 B with strap  Hamstring stretch 30 sec hold x4 B [id number] hamstring stretch B [id number] to hamstring, ITB  [id number] to hamstring, ITB     FTKE              Hip abduction Lateral band walk green loop on open floor x3 laps, kick backs 30x Lateral band walk green x 3 laps, kick backs 3x10 Lateral band walk green x3 laps, kick backs 2x10 Lateral band walk 2 L blue CLX and kick backs 3x10 B Lateral band walk green loop x 3 laps, kick backs 30x 2 laps thick green lateral band walk x3 laps green loop; kick backs 3x10 x3 laps green, kick backs 3x10 x3 laps green, kick backs 3x10 4 laps thick green in // bars    LAQ 10# 3x10 B 90 -30 °  10# 3x10 B 90-30 °  repeat  10# 4x10 B 90-30 °  2x10 B 90-30 °  10# 15# 90-30 ° 3x10 B 15# 90-30 ° 3x10 B 15# 90-30 ° 3x10 B     Bio step 6 min L4 6 min L4 6 min L4 5 min L4 5 min L4 5 min L4 5 min L4 5 min L4 5 min L4 6 min L4    Step ups  Lateral 6\" 3x8 B cueing quad and glute activation Lateral 6\" 3x10 B  Forward 2x10 B onto 6 in   Lateral 10\" 3x10 B  Forward 10\" 3x10 B Forward 10\" cueing quad and glute activation 2x10 B        Bridge 3x10 Bridge 3x10 Bridge 3x10 Bridge 3x10 Bridge 3x10  Bridge 3x10 Bridge 3x10 Bridge 3x10     Mini squat 3x10  3x10 Sit to stand from lo mat table 3x10 STS from chair with UE support 3x10 Reverse lunge in II bars to 2 airex 2x5 B Reverse lunge x10 B to pad and x10 B with slider  Single leg deadlift with reach to table 2x8  Mini squats 3x10 in // bars                  HEP: see hand out    [employer] Portal  Treatment/Session Summary:    · Response to Treatment: Pt did not report increased pain during exercise but she reports L knee \"locking\" and pain when she ambulates without the brace.  She will see referring MD next week. Continue POC. · Communication/Consultation:  None today  · Equipment provided today:  Hand out on blood clot signs and symptoms  · Recommendations/Intent for next treatment session: Next visit will focus on ROM, strengthening, pain and edema management as indicated.     Total Treatment Billable Duration:  60 mins  PT Patient Time In/Time Out  Time In: 0330  Time Out: [address], PTA    Future Appointments   Date Time Provider Tamara Brewer   5/25/2021  3:30 PM Fredia Hamming, DPT [facility]   5/27/2021  3:30 PM Fredia Hamming, DPT [facility]   6/2/2021  2:45 PM Fredia Hamming, DPT [facility]   6/4/2021  2:45 PM Fredia Hamming, DPT [facility]   6/7/2021  3:30 PM Craige Guard, PTA [facility]   6/9/2021  4:15 PM Craige Guard, PTA [facility]   6/14/2021  3:30 PM Craige Guard, PTA [facility]   6/16/2021  3:30 PM Craige Guard, PTA [facility]   6/21/2021  3:30 PM Craige Guard, PTA [facility]   6/23/2021  3:30 PM Craige Guard, PTA [facility]   6/29/2021  3:30 PM Fredia Hamming, DPT [facility]

## 2021-05-25 ENCOUNTER — HOSPITAL ENCOUNTER (OUTPATIENT)
Dept: PHYSICAL THERAPY | Age: 41
Discharge: HOME | End: 2021-05-25
Payer: COMMERCIAL

## 2021-05-25 PROCEDURE — 97016 VASOPNEUMATIC DEVICE THERAPY: CPT

## 2021-05-25 PROCEDURE — 97110 THERAPEUTIC EXERCISES: CPT

## 2021-05-25 NOTE — PROGRESS NOTES
Benoit Resendiz  : 1980  Primary: [facility]  Secondary:  Clifford Persaud @ [address], [place].  [phone]   [phone]      OUTPATIENT PHYSICAL THERAPY: Daily Treatment Note and Progress Note 2021   ICD-10: Treatment Diagnosis: Pain in right knee (M25.561), Stiffness of right knee, not elsewhere classified (M25.661), Pain in left knee (M25.562) and Stiffness of left knee, not elsewhere classified (M25.662) and Muscle weakness (generalized) (M62.81) and Other abnormalities of gait and mobility (R26.89)     L ACL allograft, B chondroplasty and meniscectomy 21    Effective Dates: 3/2/2021 TO 2021 (90 days). Frequency/Duration: 2-3 times a week for 90 Days  GOALS: (Goals have been discussed and agreed upon with patient.)  Short-Term Functional Goals: Time Frame: 4 weeks  1. Pt to report compliance with HEP MET  2. Pt to achieve 0-125 AROM B knees for normal function and mechanics MET RIGHT, PROGRESSING ON LEFT  3. Pt to demonstrate normal gait level surfaces without assistive device MET   4. Pt to jun progressive ex program without increased pain or swelling. MET  Discharge Goals: Time Frame: 16 weeks  1. Pt to increase strength for reciprocal gait on stairs ONGOING  2. Pt to increase SLS > 20 sec B for safe amb all surfaces ONGOING  3. Pt to resume working without limitation inc navigating stairs consistently ONGOING  4. 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 Continues to report buckling and soreness at the knees. Will follow up in 4 weeks with MD and will consider gel injections or further imaging.    Pain: Initial: 5/10 B LEs    Date of surgery: 21;   MD follow up 21   MD follow up 21  Post Session: no increase    Medications Last Reviewed:  5/25/2021     MD follow up: April 15, 2021    Updated Objective Findings:      Observation/Orthostatic Postural Assessment:          Steri-strips intact R med and lat portals; dressing with tegaderm intact over L knee with thick black dried blood contained;  Wearing brace locked at 0 on L   Palpation:          Good patellar mobility B  ROM:         AROM  Knee flex-ext R 102-0, 83-0 L        Calf tightness B  Strength:         Good quad set R, fair quad set L;  Further strength testing deferred at today's visit  Functional Mobility:         Gait/Ambulation:  Pt leans on B axillary crutches with step-to gait pattern, brace at 0        Transfers:  Pt uses UE assist and R LE         Stairs:  Step-to pattern or avoids currrently  Balance:          Unable to assess at today's visit    Tool Used: Lower Extremity Functional Scale (LEFS)  Score:  Initial: 8/80 Most Recent: X/80 (Date: -- )   Interpretation of Score: 20 questions each scored on a 5 point scale with 0 representing \"extreme difficulty or unable to perform\" and 4 representing \"no difficulty\". The lower the score, the greater the functional disability. 80/80 represents no disability. Minimal detectable change is 9 points. UPDATE: 3/22/21  L AROM 0-100 °  R AROM 0-115 °    3/26/21 update:   Left knee: 1-0-110 °   Right knee: 1-0-120 °   Continues to ambulate with hinged brace unlocked 0-60 °. Uses crutch for community mobility. Knee extension: 3+/5 B   SLR: 3/5 L with continued extensor lag, 4/5 R    4/21/21 update  B knee extension is 0 °    5/19/21 Update:   R knee AROM 1-0-125 °  L knee AROM 0-116 ° (painful)    MMT tested in sitting   R knee extension 5/5  R knee flexion 4/5    L knee extension 5/5  L knee flexion 4+/5         TREATMENT:   THERAPEUTIC ACTIVITY: ( see below for minutes): Therapeutic activities per grid below to improve mobility.   Required moderate verbal and manual cues to improve functional mobility . THERAPEUTIC EXERCISE: (see below for minutes):  Exercises per grid below to improve strength. Required moderate verbal and manual cues to promote proper body alignment, promote proper body posture, promote proper body mechanics and promote proper body breathing techniques. Progressed resistance, range, repetitions and complexity of movement as indicated. MANUAL THERAPY: (see below for minutes): Joint mobilization and Soft tissue mobilization was utilized and necessary because of the patient's restricted joint motion, painful spasm, loss of articular motion and restricted motion of soft tissue. MODALITIES: (see below for minutes):      for pain modulation    AQUATIC THERAPY (see below for minutes): Aquatic treatment performed per flow grid for Decreased muscle strength, Decreased endurance, Decreased static/dynamic balance and reactive control, Decreased activity endurance, Decompression, Ease of movement and Low impact and reduced weight bearing activity.     Date: 4/12/21 (visit 13) 4/16/21 (visit 14) 4/19/21 (visit 15) 4/21/21 (visit 16)  Progress note 4/26/21 (visit 17) 4/28/21 (visit 18) 5/4/21 (visit 19) 5/11/21 (visit 20) 5/13/21 (visit 21) 5/19/21 (visit 22) PN 5/25/21 (visit 23)   Modalities: 15 min  15 min  15 min  15 min 15 min  15 min 15 min  15 min  15 min  15 min 15 min    Game ready B knees Med compression 15 min  15 min  15 min  Bilateral LEs 15 min B LEs at medium compression B med compression B med compression B med compression To B knees with medium compression To B knees                  Manual Therapy:                                          Therapeutic Exercises: 45 min  45 min  45 min  45 min 45 min  45 min 45 min  45 min  45 min  45 min 45 min    Quad set B              SLR B 3x10 B   3x10 B Clamshell 3x10 B 3x10 B 20x B  20x B 20x B  2x10 B LE 3x10 B    gastroc stretch B 2x1 min  1 min calf stretch, 30x calf raise 2x1 min calf stretch L2, calf raises L1 3x10 1 min hold calf stretch and heel raises 3x10 2x1 min calf stretch, 3x10 calf raise 3x10 heel raises and 1 min hold on incline 3x10 L1, L2 2x1 min calf stretch 2x1 min L2 calf stretch, 3x10 L1 calf raise 2x1 min L2 calf stretch, 3x10 L1 calf raise 1 min hold on L2 and heel raises x30    Gait mechanics level surfaces           Walking march x 2 laps   Heel slides  2x30 sec hamstring and ITB stretch B [id number] to hamstring, mobs to L into extension gr 3 10x x30 B with strap  Hamstring stretch 30 sec hold x4 B [id number] hamstring stretch B [id number] to hamstring, ITB  [id number] to hamstring, ITB  Therapist assisted stretching into knee flexion    FTKE              Hip abduction Lateral band walk green loop on open floor x3 laps, kick backs 30x Lateral band walk green x 3 laps, kick backs 3x10 Lateral band walk green x3 laps, kick backs 2x10 Lateral band walk 2 L blue CLX and kick backs 3x10 B Lateral band walk green loop x 3 laps, kick backs 30x 2 laps thick green lateral band walk x3 laps green loop; kick backs 3x10 x3 laps green, kick backs 3x10 x3 laps green, kick backs 3x10 4 laps thick green in // bars 3 laps green loop   LAQ 10# 3x10 B 90 -30 °  10# 3x10 B 90-30 °  repeat  10# 4x10 B 90-30 °  2x10 B 90-30 °  10# 15# 90-30 ° 3x10 B 15# 90-30 ° 3x10 B 15# 90-30 ° 3x10 B  10# 90 -30 ° 3x10 B    Bio step 6 min L4 6 min L4 6 min L4 5 min L4 5 min L4 5 min L4 5 min L4 5 min L4 5 min L4 6 min L4 5 min L4   Step ups  Lateral 6\" 3x8 B cueing quad and glute activation Lateral 6\" 3x10 B  Forward 2x10 B onto 6 in   Lateral 10\" 3x10 B  Forward 10\" 3x10 B Forward 10\" cueing quad and glute activation 2x10 B        Bridge 3x10 Bridge 3x10 Bridge 3x10 Bridge 3x10 Bridge 3x10  Bridge 3x10 Bridge 3x10 Bridge 3x10 Bridge 3x10     Mini squat 3x10  3x10 Sit to stand from lo mat table 3x10 STS from chair with UE support 3x10 Reverse lunge in II bars to 2 airex 2x5 B Reverse lunge x10 B to pad and x10 B with slider  Single leg deadlift with reach to table 2x8 Mini squats 3x10 in // bars    Hamstring curls           Seated green tband 3x10 B    HEP: see hand out    Med[employer] Portal  Treatment/Session Summary:    · Response to Treatment: Crepitus is noted with end range flexion, particularly on the left that is increasingly painful. Progressed with increased posterior chain and hamstring strengthening. · Communication/Consultation:  None today  · Equipment provided today:  Hand out on blood clot signs and symptoms  · Recommendations/Intent for next treatment session: Next visit will focus on ROM, strengthening, pain and edema management as indicated.     Total Treatment Billable Duration:  60 mins  PT Patient Time In/Time Out  Time In: 1702  Time Out: [address], DPT    Future Appointments   Date Time Provider Tamara Brewer   5/27/2021  3:30 PM Carlene Power, [facility]   6/2/2021  2:45 PM Carlene Power, [facility]   6/4/2021  2:45 PM Carlene Power, [facility]   6/7/2021  3:30 PM Cori Brine, PTA [facility]   6/9/2021  4:15 PM Cori Brine, PTA [facility]   6/14/2021  3:30 PM Cori Brine, PTA [facility]   6/16/2021  3:30 PM Cori Brine, PTA [facility]   6/21/2021  3:30 PM Cori Brine, PTA [facility]   6/23/2021  3:30 PM Cori Brine, PTA [facility]   6/29/2021  3:30 PM Carlene Power, [facility]

## 2021-05-27 ENCOUNTER — HOSPITAL ENCOUNTER (OUTPATIENT)
Dept: PHYSICAL THERAPY | Age: 41
Discharge: HOME | End: 2021-05-27
Payer: COMMERCIAL

## 2021-06-04 ENCOUNTER — HOSPITAL ENCOUNTER (OUTPATIENT)
Dept: PHYSICAL THERAPY | Age: 41
Discharge: HOME | End: 2021-06-04
Payer: COMMERCIAL

## 2021-06-04 PROCEDURE — 97016 VASOPNEUMATIC DEVICE THERAPY: CPT

## 2021-06-04 PROCEDURE — 97110 THERAPEUTIC EXERCISES: CPT

## 2021-06-04 NOTE — PROGRESS NOTES
Sergei Resendiz  : 1980  Primary:   Secondary:  [id number] @ [address], [address] [place] [address]  [phone]   [phone]      OUTPATIENT PHYSICAL THERAPY: Daily Treatment Note and Recertification    ICD-10: Treatment Diagnosis: Pain in right knee (M25.561), Stiffness of right knee, not elsewhere classified (M25.661), Pain in left knee (M25.562) and Stiffness of left knee, not elsewhere classified (M25.662) and Muscle weakness (generalized) (M62.81) and Other abnormalities of gait and mobility (R26.89)     L ACL allograft, B chondroplasty and meniscectomy 21    Effective Dates:21 to 21 (90 days). Frequency/Duration: 2-3 times a week for 90 Days  GOALS: (Goals have been discussed and agreed upon with patient.)  Short-Term Functional Goals: Time Frame: 4 weeks  1. Pt to report compliance with HEP MET  2. Pt to achieve 0-125 AROM B knees for normal function and mechanics MET RIGHT, PROGRESSING ON LEFT  3. Pt to demonstrate normal gait level surfaces without assistive device MET   4. Pt to jun progressive ex program without increased pain or swelling. MET  Discharge Goals: Time Frame: 16 weeks  1. Pt to increase strength for reciprocal gait on stairs ONGOING  2. Pt to increase SLS > 20 sec B for safe amb all surfaces ONGOING  3. Pt to resume working without limitation inc navigating stairs consistently ONGOING  4. 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   Thank you for this referral,  Homar Bailey DPT     Referring Physician Signature: Ac Gaona MD _______________________________ Date _____________    _______________________________________________________________________________  Pre-treatment Symptoms/Complaints:  Continues to have pain and soreness at the lateral knee and behind the kneecap. Continues to feel stiff in general at the knees. Has been taking voltaren 2x/day but has not noticed a significant improvement. Pain: Initial: 5/10 B LEs    Date of surgery: 2/26/21;   MD follow up 5/25/21   MD follow up 6/22/21  Post Session: no increase    Medications Last Reviewed:  6/4/2021     MD follow up: April 15, 2021    Updated Objective Findings:      Observation/Orthostatic Postural Assessment:          Steri-strips intact R med and lat portals; dressing with tegaderm intact over L knee with thick black dried blood contained;  Wearing brace locked at 0 on L   Palpation:          Good patellar mobility B  ROM:         AROM  Knee flex-ext R 102-0, 83-0 L        Calf tightness B  Strength:         Good quad set R, fair quad set L;  Further strength testing deferred at today's visit  Functional Mobility:         Gait/Ambulation:  Pt leans on B axillary crutches with step-to gait pattern, brace at 0        Transfers:  Pt uses UE assist and R LE         Stairs:  Step-to pattern or avoids currrently  Balance:          Unable to assess at today's visit    Tool Used: Lower Extremity Functional Scale (LEFS)  Score:  Initial: 8/80 Most Recent: X/80 (Date: -- )   Interpretation of Score: 20 questions each scored on a 5 point scale with 0 representing \"extreme difficulty or unable to perform\" and 4 representing \"no difficulty\". The lower the score, the greater the functional disability. 80/80 represents no disability. Minimal detectable change is 9 points. UPDATE: 3/22/21  L AROM 0-100 °  R AROM 0-115 °    3/26/21 update:   Left knee: 1-0-110 °   Right knee: 1-0-120 °   Continues to ambulate with hinged brace unlocked 0-60 °. Uses crutch for community mobility.    Knee extension: 3+/5 B   SLR: 3/5 L with continued extensor lag, 4/5 R    4/21/21 update  B knee extension is 0 °    5/19/21 Update:   R knee AROM 1-0-125 °  L knee AROM 0-116 ° (painful)    MMT tested in sitting   R knee extension 5/5  R knee flexion 4/5    L knee extension 5/5  L knee flexion 4+/5    6/4/21 update:   Knee extension: 10# with seated leg extensions  Left knee: 1-0-120 ° with painful end feel  Right knee: 1-0-125 ° with painful end feel  Continues to ambulate with antalgic gait and decreased knee excursion during left stance. TREATMENT:   THERAPEUTIC ACTIVITY: ( see below for minutes): Therapeutic activities per grid below to improve mobility. Required moderate verbal and manual cues to improve functional mobility . THERAPEUTIC EXERCISE: (see below for minutes):  Exercises per grid below to improve strength. Required moderate verbal and manual cues to promote proper body alignment, promote proper body posture, promote proper body mechanics and promote proper body breathing techniques. Progressed resistance, range, repetitions and complexity of movement as indicated. MANUAL THERAPY: (see below for minutes): Joint mobilization and Soft tissue mobilization was utilized and necessary because of the patient's restricted joint motion, painful spasm, loss of articular motion and restricted motion of soft tissue. MODALITIES: (see below for minutes):      for pain modulation    AQUATIC THERAPY (see below for minutes): Aquatic treatment performed per flow grid for Decreased muscle strength, Decreased endurance, Decreased static/dynamic balance and reactive control, Decreased activity endurance, Decompression, Ease of movement and Low impact and reduced weight bearing activity.     Date: 5/4/21 (visit 19) 5/11/21 (visit 20) 5/13/21 (visit 21) 5/19/21 (visit 22) PN 5/25/21 (visit 23) 6/4/21 (visit 24)     Modalities: 15 min  15 min  15 min  15 min 15 min  15 min      Game ready B knees B med compression B med compression B med compression To B knees with medium compression To B knees  To B knees                Manual Therapy:                                 Therapeutic Exercises: 45 min  45 min 45 min  45 min 45 min  40 min      Quad set B           SLR B 20x B  20x B 20x B  2x10 B LE 3x10 B  30x B      gastroc stretch B 3x10 L1, L2 2x1 min calf stretch 2x1 min L2 calf stretch, 3x10 L1 calf raise 2x1 min L2 calf stretch, 3x10 L1 calf raise 1 min hold on L2 and heel raises x30  2x1 min knees straight     Gait mechanics level surfaces     Walking march x 2 laps      Heel slides [id number] hamstring stretch B [id number] to hamstring, ITB  [id number] to hamstring, ITB  Therapist assisted stretching into knee flexion  Patellar mobs B; [id number] to ITB B      FTKE           Hip abduction x3 laps green loop; kick backs 3x10 x3 laps green, kick backs 3x10 x3 laps green, kick backs 3x10 4 laps thick green in // bars 3 laps green loop      LAQ 15# 90-30 ° 3x10 B 15# 90-30 ° 3x10 B 15# 90-30 ° 3x10 B  10# 90 -30 ° 3x10 B  10# 90-30 ° 3x10 B      Bio step 5 min L4 5 min L4 5 min L4 6 min L4 5 min L4 5 min L4     Step ups Lateral 10\" 3x10 B  Forward 10\" 3x10 B Forward 10\" cueing quad and glute activation 2x10 B         Bridge 3x10  Bridge 3x10 Bridge 3x10 Bridge 3x10 Bridge 3x10  Bridge 3x10       Single leg deadlift with reach to table 2x8  Mini squats 3x10 in // bars       Hamstring curls     Seated green tband 3x10 B  Seated green tband 3x10 B      HEP: see hand out    [employer] Portal  Treatment/Session Summary:    · Response to Treatment: end range flexion remains limited and painful on the left contributing to increased compressive load at the anterior knee. · Communication/Consultation:  None today  · Equipment provided today:  Hand out on blood clot signs and symptoms  · Recommendations/Intent for next treatment session: Next visit will focus on ROM, strengthening, pain and edema management as indicated.     Total Treatment Billable Duration:  55 mins  PT Patient Time In/Time Out  Time In: 1450  Time Out: [address], DPT    Future Appointments   Date Time Provider Tamara Brewer   6/7/2021  3:30 PM Altagracia Shields [facility]   6/9/2021  4:15 PM Kashmir Hock, PTA [facility]   6/14/2021  3:30 PM Kashmir Hock, PTA [facility]   6/16/2021  3:30 PM Kashmir Hock, PTA [facility]   6/21/2021  3:30 PM Kashmir Hock, PTA [facility]   6/23/2021  3:30 PM Kashmir Hock, PTA [facility]   6/29/2021  7:00 PM Marisol Watts DPT [facility]

## 2021-06-07 ENCOUNTER — HOSPITAL ENCOUNTER (OUTPATIENT)
Dept: PHYSICAL THERAPY | Age: 41
Discharge: HOME | End: 2021-06-07
Payer: COMMERCIAL

## 2021-06-07 PROCEDURE — 97016 VASOPNEUMATIC DEVICE THERAPY: CPT

## 2021-06-07 PROCEDURE — 97110 THERAPEUTIC EXERCISES: CPT

## 2021-06-07 NOTE — PROGRESS NOTES
Anila Resendiz  : 1980  Primary:   Secondary:  [facility] @ [address], [address] [place] [address]  [phone]   [phone]      OUTPATIENT PHYSICAL THERAPY: Daily Treatment Note and Progress Note 2021   ICD-10: Treatment Diagnosis: Pain in right knee (M25.561), Stiffness of right knee, not elsewhere classified (M25.661), Pain in left knee (M25.562) and Stiffness of left knee, not elsewhere classified (M25.662) and Muscle weakness (generalized) (M62.81) and Other abnormalities of gait and mobility (R26.89)     L ACL allograft, B chondroplasty and meniscectomy 21    Effective Dates: 3/2/2021 TO 2021 (90 days). Frequency/Duration: 2-3 times a week for 90 Days  GOALS: (Goals have been discussed and agreed upon with patient.)  Short-Term Functional Goals: Time Frame: 4 weeks  1. Pt to report compliance with HEP MET  2. Pt to achieve 0-125 AROM B knees for normal function and mechanics MET RIGHT, PROGRESSING ON LEFT  3. Pt to demonstrate normal gait level surfaces without assistive device MET   4. Pt to jun progressive ex program without increased pain or swelling. MET  Discharge Goals: Time Frame: 16 weeks  1. Pt to increase strength for reciprocal gait on stairs ONGOING  2. Pt to increase SLS > 20 sec B for safe amb all surfaces ONGOING  3. Pt to resume working without limitation inc navigating stairs consistently ONGOING  4. Pt to resume walking dogs without limitations ONGOING  _______________________________________________________________________________  Pre-treatment Symptoms/Complaints:  Pt states \"The pain is about the same. The knees still feel like they're giving out when I walk. \"  Pain: Initial: 7/10 B LEs    Date of surgery: 21;   MD follow up 21   MD follow up 21  Post Session: no increase    Medications Last Reviewed:  2021     MD follow up: April 15, 2021    Updated Objective Findings: Observation/Orthostatic Postural Assessment:          Steri-strips intact R med and lat portals; dressing with tegaderm intact over L knee with thick black dried blood contained;  Wearing brace locked at 0 on L   Palpation:          Good patellar mobility B  ROM:         AROM  Knee flex-ext R 102-0, 83-0 L        Calf tightness B  Strength:         Good quad set R, fair quad set L;  Further strength testing deferred at today's visit  Functional Mobility:         Gait/Ambulation:  Pt leans on B axillary crutches with step-to gait pattern, brace at 0        Transfers:  Pt uses UE assist and R LE         Stairs:  Step-to pattern or avoids currrently  Balance:          Unable to assess at today's visit    Tool Used: Lower Extremity Functional Scale (LEFS)  Score:  Initial: 8/80 Most Recent: X/80 (Date: -- )   Interpretation of Score: 20 questions each scored on a 5 point scale with 0 representing \"extreme difficulty or unable to perform\" and 4 representing \"no difficulty\". The lower the score, the greater the functional disability. 80/80 represents no disability. Minimal detectable change is 9 points. UPDATE: 3/22/21  L AROM 0-100 °  R AROM 0-115 °    3/26/21 update:   Left knee: 1-0-110 °   Right knee: 1-0-120 °   Continues to ambulate with hinged brace unlocked 0-60 °. Uses crutch for community mobility. Knee extension: 3+/5 B   SLR: 3/5 L with continued extensor lag, 4/5 R    4/21/21 update  B knee extension is 0 °    5/19/21 Update:   R knee AROM 1-0-125 °  L knee AROM 0-116 ° (painful)    MMT tested in sitting   R knee extension 5/5  R knee flexion 4/5    L knee extension 5/5  L knee flexion 4+/5         TREATMENT:   THERAPEUTIC ACTIVITY: ( see below for minutes): Therapeutic activities per grid below to improve mobility. Required moderate verbal and manual cues to improve functional mobility . THERAPEUTIC EXERCISE: (see below for minutes):  Exercises per grid below to improve strength.   Required moderate verbal and manual cues to promote proper body alignment, promote proper body posture, promote proper body mechanics and promote proper body breathing techniques. Progressed resistance, range, repetitions and complexity of movement as indicated. MANUAL THERAPY: (see below for minutes): Joint mobilization and Soft tissue mobilization was utilized and necessary because of the patient's restricted joint motion, painful spasm, loss of articular motion and restricted motion of soft tissue. MODALITIES: (see below for minutes):      for pain modulation    AQUATIC THERAPY (see below for minutes): Aquatic treatment performed per flow grid for Decreased muscle strength, Decreased endurance, Decreased static/dynamic balance and reactive control, Decreased activity endurance, Decompression, Ease of movement and Low impact and reduced weight bearing activity.     Date: 5/4/21 (visit 19) 5/11/21 (visit 20) 5/13/21 (visit 21) 5/19/21 (visit 22) PN 5/25/21 (visit 23) 6/4/21 (visit 24) 6/7/21 (visit 26) (addendum from visit on 5/6 to correct visit counts)    Modalities: 15 min  15 min  15 min  15 min 15 min  15 min  15 min    Game ready B knees B med compression B med compression B med compression To B knees with medium compression To B knees  To B knees To B knees               Manual Therapy:                                 Therapeutic Exercises: 45 min  45 min  45 min  45 min 45 min  40 min  40 min    Quad set B           SLR B 20x B  20x B 20x B  2x10 B LE 3x10 B  30x B  x20 B    gastroc stretch B 3x10 L1, L2 2x1 min calf stretch 2x1 min L2 calf stretch, 3x10 L1 calf raise 2x1 min L2 calf stretch, 3x10 L1 calf raise 1 min hold on L2 and heel raises x30  2x1 min knees straight 1 min hold on L1    Gait mechanics level surfaces     Walking march x 2 laps      Heel slides [id number] hamstring stretch B [id number] to hamstring, ITB  [id number] to hamstring, ITB  Therapist assisted stretching into knee flexion  Patellar mobs B; [id number] to ITB B  x30 L LE with strap. Hamstring and IT B stretch 30 sec hold x 4 B with strap    FTKE       Lateral step up with foot on 4 in box, x20 ea LE    Hip abduction x3 laps green loop; kick backs 3x10 x3 laps green, kick backs 3x10 x3 laps green, kick backs 3x10 4 laps thick green in // bars 3 laps green loop  2 laps green    LAQ 15# 90-30 ° 3x10 B 15# 90-30 ° 3x10 B 15# 90-30 ° 3x10 B  10# 90 -30 ° 3x10 B  10# 90-30 ° 3x10 B      Bio step 5 min L4 5 min L4 5 min L4 6 min L4 5 min L4 5 min L4 6 min L4    Step ups Lateral 10\" 3x10 B  Forward 10\" 3x10 B Forward 10\" cueing quad and glute activation 2x10 B         Bridge 3x10  Bridge 3x10 Bridge 3x10 Bridge 3x10 Bridge 3x10  Bridge 3x10       Single leg deadlift with reach to table 2x8  Mini squats 3x10 in // bars       Hamstring curls     Seated green tband 3x10 B  Seated green tband 3x10 B      HEP: see hand out    [facility]  Treatment/Session Summary:    · Response to Treatment: Pt reports pain and crepitus with exercise. Continue to progress strengthening. · Communication/Consultation:  None today  · Equipment provided today:  Hand out on blood clot signs and symptoms  · Recommendations/Intent for next treatment session: Next visit will focus on ROM, strengthening, pain and edema management as indicated.     Total Treatment Billable Duration:  55 mins  PT Patient Time In/Time Out  Time In: 0335  Time Out: Brady Kelly 47, PTA    Future Appointments   Date Time Provider Tamara Brewer   6/9/2021  4:15 PM Crissie Sor, David Elise [facility]   6/14/2021  3:30 PM Crissie Sor, PTA [facility]   6/16/2021  3:30 PM [facility]   6/21/2021  3:30 PM Crissie Sor, PTA [facility]   6/23/2021  3:30 PM [facility] Sor[facility]   6/29/2021  7:00 PM Karissa Officer, DPT [facility]

## 2021-06-09 ENCOUNTER — HOSPITAL ENCOUNTER (OUTPATIENT)
Dept: PHYSICAL THERAPY | Age: 41
Discharge: HOME | End: 2021-06-09
Payer: COMMERCIAL

## 2021-06-09 PROCEDURE — 97016 VASOPNEUMATIC DEVICE THERAPY: CPT

## 2021-06-09 PROCEDURE — 97110 THERAPEUTIC EXERCISES: CPT

## 2021-06-09 NOTE — PROGRESS NOTES
Ed Resendiz  : 1980  Primary:   Secondary:  Lois Feng @ [address], [address] [place] [address]  [phone]   [phone]      OUTPATIENT PHYSICAL THERAPY: Daily Treatment Note  2021   ICD-10: Treatment Diagnosis: Pain in right knee (M25.561), Stiffness of right knee, not elsewhere classified (M25.661), Pain in left knee (M25.562) and Stiffness of left knee, not elsewhere classified (M25.662) and Muscle weakness (generalized) (M62.81) and Other abnormalities of gait and mobility (R26.89)     L ACL allograft, B chondroplasty and meniscectomy 21    Effective Dates: 2021 TO 2021 (90 days). Frequency/Duration: 2-3 times a week for 90 Days  GOALS: (Goals have been discussed and agreed upon with patient.)  Short-Term Functional Goals: Time Frame: 4 weeks  1. Pt to report compliance with HEP MET  2. Pt to achieve 0-125 AROM B knees for normal function and mechanics MET RIGHT, PROGRESSING ON LEFT  3. Pt to demonstrate normal gait level surfaces without assistive device MET   4. Pt to jun progressive ex program without increased pain or swelling. MET  Discharge Goals: Time Frame: 16 weeks  1. Pt to increase strength for reciprocal gait on stairs ONGOING  2. Pt to increase SLS > 20 sec B for safe amb all surfaces ONGOING  3. Pt to resume working without limitation inc navigating stairs consistently ONGOING  4. Pt to resume walking dogs without limitations ONGOING  _______________________________________________________________________________  Pre-treatment Symptoms/Complaints:  Pt states \"The pain is about the same. \"  Pain: Initial: 7/10 B LEs    Date of surgery: 21;   MD follow up 21   MD follow up 21  Post Session: no increase    Medications Last Reviewed:  2021     MD follow up: April 15, 2021    Updated Objective Findings:      Observation/Orthostatic Postural Assessment:          Steri-strips intact R med and lat portals; dressing with tegaderm intact over L knee with thick black dried blood contained;  Wearing brace locked at 0 on L   Palpation:          Good patellar mobility B  ROM:         AROM  Knee flex-ext R 102-0, 83-0 L        Calf tightness B  Strength:         Good quad set R, fair quad set L;  Further strength testing deferred at today's visit  Functional Mobility:         Gait/Ambulation:  Pt leans on B axillary crutches with step-to gait pattern, brace at 0        Transfers:  Pt uses UE assist and R LE         Stairs:  Step-to pattern or avoids currrently  Balance:          Unable to assess at today's visit    Tool Used: Lower Extremity Functional Scale (LEFS)  Score:  Initial: 8/80 Most Recent: X/80 (Date: -- )   Interpretation of Score: 20 questions each scored on a 5 point scale with 0 representing \"extreme difficulty or unable to perform\" and 4 representing \"no difficulty\". The lower the score, the greater the functional disability. 80/80 represents no disability. Minimal detectable change is 9 points. UPDATE: 3/22/21  L AROM 0-100 °  R AROM 0-115 °    3/26/21 update:   Left knee: 1-0-110 °   Right knee: 1-0-120 °   Continues to ambulate with hinged brace unlocked 0-60 °. Uses crutch for community mobility. Knee extension: 3+/5 B   SLR: 3/5 L with continued extensor lag, 4/5 R    4/21/21 update  B knee extension is 0 °    5/19/21 Update:   R knee AROM 1-0-125 °  L knee AROM 0-116 ° (painful)    MMT tested in sitting   R knee extension 5/5  R knee flexion 4/5    L knee extension 5/5  L knee flexion 4+/5         TREATMENT:   THERAPEUTIC ACTIVITY: ( see below for minutes): Therapeutic activities per grid below to improve mobility. Required moderate verbal and manual cues to improve functional mobility . THERAPEUTIC EXERCISE: (see below for minutes):  Exercises per grid below to improve strength.   Required moderate verbal and manual cues to promote proper body alignment, promote proper body posture, promote proper body mechanics and promote proper body breathing techniques. Progressed resistance, range, repetitions and complexity of movement as indicated. MANUAL THERAPY: (see below for minutes): Joint mobilization and Soft tissue mobilization was utilized and necessary because of the patient's restricted joint motion, painful spasm, loss of articular motion and restricted motion of soft tissue. MODALITIES: (see below for minutes):      for pain modulation    AQUATIC THERAPY (see below for minutes): Aquatic treatment performed per flow grid for Decreased muscle strength, Decreased endurance, Decreased static/dynamic balance and reactive control, Decreased activity endurance, Decompression, Ease of movement and Low impact and reduced weight bearing activity.     Date: 5/4/21 (visit 19) 5/11/21 (visit 20) 5/13/21 (visit 21) 5/19/21 (visit 22) PN 5/25/21 (visit 23) 6/4/21 (visit 24) 6/7/21 (visit 26) (addendum from visit on 5/6 to correct visit counts) 6/9/21 (visit 27)   Modalities: 15 min  15 min  15 min  15 min 15 min  15 min  15 min 15 min   Game ready B knees B med compression B med compression B med compression To B knees with medium compression To B knees  To B knees To B knees To B knees at coldest setting              Manual Therapy:                                 Therapeutic Exercises: 45 min  45 min  45 min  45 min 45 min  40 min  40 min 30 min   Prone hip extension        2x10 B LE   SLR B 20x B  20x B 20x B  2x10 B LE 3x10 B  30x B  x20 B 2x15 B LE   gastroc stretch B 3x10 L1, L2 2x1 min calf stretch 2x1 min L2 calf stretch, 3x10 L1 calf raise 2x1 min L2 calf stretch, 3x10 L1 calf raise 1 min hold on L2 and heel raises x30  2x1 min knees straight 1 min hold on L1 Heel raises x30   Gait mechanics level surfaces     Walking march x 2 laps      Heel slides [id number] hamstring stretch B [id number] to hamstring, ITB  [id number] to hamstring, ITB  Therapist assisted stretching into knee flexion  Patellar mobs B; [id number] to ITB B  x30 L LE with strap. Hamstring and IT B stretch 30 sec hold x 4 B with strap    FTKE       Lateral step up with foot on 4 in box, x20 ea LE    Hip abduction x3 laps green loop; kick backs 3x10 x3 laps green, kick backs 3x10 x3 laps green, kick backs 3x10 4 laps thick green in // bars 3 laps green loop  2 laps green Side lying 2x15 B   LAQ 15# 90-30 ° 3x10 B 15# 90-30 ° 3x10 B 15# 90-30 ° 3x10 B  10# 90 -30 ° 3x10 B  10# 90-30 ° 3x10 B      Bio step 5 min L4 5 min L4 5 min L4 6 min L4 5 min L4 5 min L4 6 min L4 6 min L6   Step ups Lateral 10\" 3x10 B  Forward 10\" 3x10 B Forward 10\" cueing quad and glute activation 2x10 B         Bridge 3x10  Bridge 3x10 Bridge 3x10 Bridge 3x10 Bridge 3x10  Bridge 3x10  Bridge 3x10     Single leg deadlift with reach to table 2x8  Mini squats 3x10 in // bars    Mini squats 3x10   Hamstring curls     Seated green tband 3x10 B  Seated green tband 3x10 B      HEP: see hand out    [employer] Portal  Treatment/Session Summary:    · Response to Treatment: Pt demonstrates good body mechanics with the exercises and did not report increased pain. Pt continues to ambulate slowly with a decreased heel strike bilaterally. Continue to progress strengthening. · Communication/Consultation:  None today  · Equipment provided today:  Hand out on blood clot signs and symptoms  · Recommendations/Intent for next treatment session: Next visit will focus on ROM, strengthening, pain and edema management as indicated.     Total Treatment Billable Duration:  45 mins  PT Patient Time In/Time Out  Time In: 9716  Time Out: [facility]    Future Appointments   Date Time Provider Tamara Schultzi   6/14/2021  3:30 PM Nato Johansen [facility]   6/16/2021  3:30 PM PABLO BlancOFVALERIY [facility]   6/21/2021  3:30 PM TIGRE JohansenOFVALERIY [facility]   6/23/2021  3:30 PM TIGRE JohansenOFR [facility]   6/29/2021  7:00 PM Calvin Chambers PABLO RYAN [facility]

## 2021-06-11 ENCOUNTER — APPOINTMENT (OUTPATIENT)
Dept: PHYSICAL THERAPY | Age: 41
End: 2021-06-11
Payer: COMMERCIAL

## 2021-06-14 ENCOUNTER — HOSPITAL ENCOUNTER (OUTPATIENT)
Dept: PHYSICAL THERAPY | Age: 41
Discharge: HOME | End: 2021-06-14
Payer: COMMERCIAL

## 2021-06-14 PROCEDURE — 97016 VASOPNEUMATIC DEVICE THERAPY: CPT

## 2021-06-14 PROCEDURE — 97110 THERAPEUTIC EXERCISES: CPT

## 2021-06-14 NOTE — PROGRESS NOTES
Rey Resendiz  : 1980  Primary:   Secondary:  [address] [facility] @ [address], [address] [place] [address]  [phone]   [phone]      OUTPATIENT PHYSICAL THERAPY: Daily Treatment Note  2021   ICD-10: Treatment Diagnosis: Pain in right knee (M25.561), Stiffness of right knee, not elsewhere classified (M25.661), Pain in left knee (M25.562) and Stiffness of left knee, not elsewhere classified (M25.662) and Muscle weakness (generalized) (M62.81) and Other abnormalities of gait and mobility (R26.89)     L ACL allograft, B chondroplasty and meniscectomy 21    Effective Dates: 2021 TO 2021 (90 days). Frequency/Duration: 2-3 times a week for 90 Days  GOALS: (Goals have been discussed and agreed upon with patient.)  Short-Term Functional Goals: Time Frame: 4 weeks  1. Pt to report compliance with HEP MET  2. Pt to achieve 0-125 AROM B knees for normal function and mechanics MET RIGHT, PROGRESSING ON LEFT  3. Pt to demonstrate normal gait level surfaces without assistive device MET   4. Pt to jun progressive ex program without increased pain or swelling. MET  Discharge Goals: Time Frame: 16 weeks  1. Pt to increase strength for reciprocal gait on stairs ONGOING  2. Pt to increase SLS > 20 sec B for safe amb all surfaces ONGOING  3. Pt to resume working without limitation inc navigating stairs consistently ONGOING  4. Pt to resume walking dogs without limitations ONGOING  _______________________________________________________________________________  Pre-treatment Symptoms/Complaints:  Pt states \"The knees are fine if I don't ask them to do anything. \"  Pain: Initial: 4-5/10 B knees    Date of surgery: 21;   MD follow up 21   MD follow up 21  Post Session: no increase    Medications Last Reviewed:  2021     MD follow up: April 15, 2021    Updated Objective Findings:      Observation/Orthostatic Postural Assessment: Steri-strips intact R med and lat portals; dressing with tegaderm intact over L knee with thick black dried blood contained;  Wearing brace locked at 0 on L   Palpation:          Good patellar mobility B  ROM:         AROM  Knee flex-ext R 102-0, 83-0 L        Calf tightness B  Strength:         Good quad set R, fair quad set L;  Further strength testing deferred at today's visit  Functional Mobility:         Gait/Ambulation:  Pt leans on B axillary crutches with step-to gait pattern, brace at 0        Transfers:  Pt uses UE assist and R LE         Stairs:  Step-to pattern or avoids currrently  Balance:          Unable to assess at today's visit    Tool Used: Lower Extremity Functional Scale (LEFS)  Score:  Initial: 8/80 Most Recent: X/80 (Date: -- )   Interpretation of Score: 20 questions each scored on a 5 point scale with 0 representing \"extreme difficulty or unable to perform\" and 4 representing \"no difficulty\". The lower the score, the greater the functional disability. 80/80 represents no disability. Minimal detectable change is 9 points. UPDATE: 3/22/21  L AROM 0-100 °  R AROM 0-115 °    3/26/21 update:   Left knee: 1-0-110 °   Right knee: 1-0-120 °   Continues to ambulate with hinged brace unlocked 0-60 °. Uses crutch for community mobility. Knee extension: 3+/5 B   SLR: 3/5 L with continued extensor lag, 4/5 R    4/21/21 update  B knee extension is 0 °    5/19/21 Update:   R knee AROM 1-0-125 °  L knee AROM 0-116 ° (painful)    MMT tested in sitting   R knee extension 5/5  R knee flexion 4/5    L knee extension 5/5  L knee flexion 4+/5         TREATMENT:   THERAPEUTIC ACTIVITY: ( see below for minutes): Therapeutic activities per grid below to improve mobility. Required moderate verbal and manual cues to improve functional mobility . THERAPEUTIC EXERCISE: (see below for minutes):  Exercises per grid below to improve strength.   Required moderate verbal and manual cues to promote proper body alignment, promote proper body posture, promote proper body mechanics and promote proper body breathing techniques. Progressed resistance, range, repetitions and complexity of movement as indicated. MANUAL THERAPY: (see below for minutes): Joint mobilization and Soft tissue mobilization was utilized and necessary because of the patient's restricted joint motion, painful spasm, loss of articular motion and restricted motion of soft tissue. MODALITIES: (see below for minutes):      for pain modulation    AQUATIC THERAPY (see below for minutes): Aquatic treatment performed per flow grid for Decreased muscle strength, Decreased endurance, Decreased static/dynamic balance and reactive control, Decreased activity endurance, Decompression, Ease of movement and Low impact and reduced weight bearing activity.     Date: 5/4/21 (visit 19) 5/11/21 (visit 20) 5/13/21 (visit 21) 5/19/21 (visit 22) PN 5/25/21 (visit 23) 6/4/21 (visit 24) 6/7/21 (visit 26) (addendum from visit on 5/6 to correct visit counts) 6/9/21 (visit 27) 6/14/21 (visit 28)   Modalities: 15 min  15 min  15 min  15 min 15 min  15 min  15 min 15 min 15 min   Game ready B knees B med compression B med compression B med compression To B knees with medium compression To B knees  To B knees To B knees To B knees at coldest setting To B knees               Manual Therapy:                                    Therapeutic Exercises: 45 min  45 min  45 min  45 min 45 min  40 min  40 min 30 min 45 min   Prone hip extension        2x10 B LE    SLR B 20x B  20x B 20x B  2x10 B LE 3x10 B  30x B  x20 B 2x15 B LE    gastroc stretch B 3x10 L1, L2 2x1 min calf stretch 2x1 min L2 calf stretch, 3x10 L1 calf raise 2x1 min L2 calf stretch, 3x10 L1 calf raise 1 min hold on L2 and heel raises x30  2x1 min knees straight 1 min hold on L1 Heel raises x30 Heel raises x30 and incline stretch on L1, 1 min hold   Gait mechanics level surfaces     Walking march x 2 laps       Heel slides [id number] hamstring stretch B [id number] to hamstring, ITB  [id number] to hamstring, ITB  Therapist assisted stretching into knee flexion  Patellar mobs B; [id number] to ITB B  x30 L LE with strap. Hamstring and IT B stretch 30 sec hold x 4 B with strap  Hamstring stretch 30 sec hold x 4 B and heels x30 L LE with 5 sec hold   FTKE       Lateral step up with foot on 4 in box, x20 ea LE     Hip abduction x3 laps green loop; kick backs 3x10 x3 laps green, kick backs 3x10 x3 laps green, kick backs 3x10 4 laps thick green in // bars 3 laps green loop  2 laps green Side lying 2x15 B 1 lap knees extended and 1 L knees flexed, green band   LAQ 15# 90-30 ° 3x10 B 15# 90-30 ° 3x10 B 15# 90-30 ° 3x10 B  10# 90 -30 ° 3x10 B  10# 90-30 ° 3x10 B    3x10 B LE 90-30 °, 10#     Bio step 5 min L4 5 min L4 5 min L4 6 min L4 5 min L4 5 min L4 6 min L4 6 min L6 6 min L7   Step ups Lateral 10\" 3x10 B  Forward 10\" 3x10 B Forward 10\" cueing quad and glute activation 2x10 B          Bridge 3x10  Bridge 3x10 Bridge 3x10 Bridge 3x10 Bridge 3x10  Bridge 3x10  Bridge 3x10 Bridge 3x10     Single leg deadlift with reach to table 2x8  Mini squats 3x10 in // bars    Mini squats 3x10 Leg press 60# 3x10 (double leg)   Hamstring curls     Seated green tband 3x10 B  Seated green tband 3x10 B       HEP: see hand out    [facility]  Treatment/Session Summary:    · Response to Treatment: Pt had difficulty with the leg press due to quadriceps weakness. Continue to progress strengthening. · Communication/Consultation:  None today  · Equipment provided today:  Hand out on blood clot signs and symptoms  · Recommendations/Intent for next treatment session: Next visit will focus on ROM, strengthening, pain and edema management as indicated.     Total Treatment Billable Duration:  60 mins  PT Patient Time In/Time Out  Time In: 0330  Time Out: Brady Kelly 47, PTA    Future Appointments   Date Time Provider Tamara Brewer   6/16/2021  3:30 PM Cordell Ward, Mouna Jones, DPT [facility]   6/21/2021  3:30 PM Cori Brine, PTA [facility]   6/23/2021  3:30 PM Cori Brine, PTA [facility]   6/29/2021  7:00 PM Volney Gambles, DPT [facility]   7/6/2021  3:30 PM Volney Gambles, DPT [facility]   7/8/2021  3:30 PM Volney Gambles, DPT [facility]   7/12/2021  3:30 PM Volney Gambles, DPT [facility]   7/14/2021  3:30 PM Volney Gambles, DPT [facility]   7/19/2021  3:30 PM Volney Gambles, DPT [facility]   7/21/2021  3:30 PM Volney Gambles, DPT [facility]   7/26/2021  3:30 PM Volney Gambles, DPT [facility]   7/28/2021  3:30 PM Volney Gambles, DPT [facility]

## 2021-06-16 ENCOUNTER — HOSPITAL ENCOUNTER (OUTPATIENT)
Dept: PHYSICAL THERAPY | Age: 41
Discharge: HOME | End: 2021-06-16
Payer: COMMERCIAL

## 2021-06-16 PROCEDURE — 97016 VASOPNEUMATIC DEVICE THERAPY: CPT

## 2021-06-16 PROCEDURE — 97110 THERAPEUTIC EXERCISES: CPT

## 2021-06-16 NOTE — PROGRESS NOTES
Sridhar Resendiz  : 1980  Primary:   Secondary:  [address] [facility] @ [address] [place] [address]  [phone]   [phone]      OUTPATIENT PHYSICAL THERAPY: Daily Treatment Note  2021   ICD-10: Treatment Diagnosis: Pain in right knee (M25.561), Stiffness of right knee, not elsewhere classified (M25.661), Pain in left knee (M25.562) and Stiffness of left knee, not elsewhere classified (M25.662) and Muscle weakness (generalized) (M62.81) and Other abnormalities of gait and mobility (R26.89)     L ACL allograft, B chondroplasty and meniscectomy 21    Effective Dates: 2021 TO 2021 (90 days). Frequency/Duration: 2-3 times a week for 90 Days  GOALS: (Goals have been discussed and agreed upon with patient.)  Short-Term Functional Goals: Time Frame: 4 weeks  1. Pt to report compliance with HEP MET  2. Pt to achieve 0-125 AROM B knees for normal function and mechanics MET RIGHT, PROGRESSING ON LEFT  3. Pt to demonstrate normal gait level surfaces without assistive device MET   4. Pt to jun progressive ex program without increased pain or swelling. MET  Discharge Goals: Time Frame: 16 weeks  1. Pt to increase strength for reciprocal gait on stairs ONGOING  2. Pt to increase SLS > 20 sec B for safe amb all surfaces ONGOING  3. Pt to resume working without limitation inc navigating stairs consistently ONGOING  4. Pt to resume walking dogs without limitations ONGOING  _______________________________________________________________________________  Pre-treatment Symptoms/Complaints:  \"I am really sore from yesterday. They been clicking a bit and are just sore. \"  Pain: Initial: 4-5/10 B knees    Date of surgery: 21;   MD follow up 21   MD follow up 21  Post Session: no increase    Medications Last Reviewed:  2021     MD follow up: April 15, 2021    Updated Objective Findings:      Observation/Orthostatic Postural Assessment: Steri-strips intact R med and lat portals; dressing with tegaderm intact over L knee with thick black dried blood contained;  Wearing brace locked at 0 on L   Palpation:          Good patellar mobility B  ROM:         AROM  Knee flex-ext R 102-0, 83-0 L        Calf tightness B  Strength:         Good quad set R, fair quad set L;  Further strength testing deferred at today's visit  Functional Mobility:         Gait/Ambulation:  Pt leans on B axillary crutches with step-to gait pattern, brace at 0        Transfers:  Pt uses UE assist and R LE         Stairs:  Step-to pattern or avoids currrently  Balance:          Unable to assess at today's visit    Tool Used: Lower Extremity Functional Scale (LEFS)  Score:  Initial: 8/80 Most Recent: X/80 (Date: -- )   Interpretation of Score: 20 questions each scored on a 5 point scale with 0 representing \"extreme difficulty or unable to perform\" and 4 representing \"no difficulty\". The lower the score, the greater the functional disability. 80/80 represents no disability. Minimal detectable change is 9 points. UPDATE: 3/22/21  L AROM 0-100 °  R AROM 0-115 °    3/26/21 update:   Left knee: 1-0-110 °   Right knee: 1-0-120 °   Continues to ambulate with hinged brace unlocked 0-60 °. Uses crutch for community mobility. Knee extension: 3+/5 B   SLR: 3/5 L with continued extensor lag, 4/5 R    4/21/21 update  B knee extension is 0 °    5/19/21 Update:   R knee AROM 1-0-125 °  L knee AROM 0-116 ° (painful)    MMT tested in sitting   R knee extension 5/5  R knee flexion 4/5    L knee extension 5/5  L knee flexion 4+/5         TREATMENT:   THERAPEUTIC ACTIVITY: ( see below for minutes): Therapeutic activities per grid below to improve mobility. Required moderate verbal and manual cues to improve functional mobility . THERAPEUTIC EXERCISE: (see below for minutes):  Exercises per grid below to improve strength.   Required moderate verbal and manual cues to promote proper body alignment, promote proper body posture, promote proper body mechanics and promote proper body breathing techniques. Progressed resistance, range, repetitions and complexity of movement as indicated. MANUAL THERAPY: (see below for minutes): Joint mobilization and Soft tissue mobilization was utilized and necessary because of the patient's restricted joint motion, painful spasm, loss of articular motion and restricted motion of soft tissue. MODALITIES: (see below for minutes):      for pain modulation    AQUATIC THERAPY (see below for minutes): Aquatic treatment performed per flow grid for Decreased muscle strength, Decreased endurance, Decreased static/dynamic balance and reactive control, Decreased activity endurance, Decompression, Ease of movement and Low impact and reduced weight bearing activity. Date: 5/19/21 (visit 22) PN 5/25/21 (visit 23) 6/4/21 (visit 24) 6/7/21 (visit 26) (addendum from visit on 5/6 to correct visit counts) 6/9/21 (visit 27) 6/14/21 (visit 28) 6/16/21 (visit 29)    Modalities: 15 min 15 min  15 min  15 min 15 min 15 min 15 min     Game ready B knees To B knees with medium compression To B knees  To B knees To B knees To B knees at coldest setting To B knees 15 min to B knees               Manual Therapy:                                 Therapeutic Exercises: 45 min 45 min  40 min  40 min 30 min 45 min 45 min     Prone hip extension     2x10 B LE      SLR B 2x10 B LE 3x10 B  30x B  x20 B 2x15 B LE      gastroc stretch B 1 min hold on L2 and heel raises x30  2x1 min knees straight 1 min hold on L1 Heel raises x30 Heel raises x30 and incline stretch on L1, 1 min hold 2x1 min calf stretch, 3x10 calf raise    Gait mechanics level surfaces  Walking march x 2 laps         Heel slides  Therapist assisted stretching into knee flexion  Patellar mobs B; [id number] to ITB B  x30 L LE with strap.  Hamstring and IT B stretch 30 sec hold x 4 B with strap  Hamstring stretch 30 sec hold x 4 B and heels x30 L LE with 5 sec hold [id number] hamstring stretch, ITB stretch, therapist assisted stretch into extension and flexion    FTKE    Lateral step up with foot on 4 in box, x20 ea LE   Lateral step up 6\" 3x10 B     Hip abduction 4 laps thick green in // bars 3 laps green loop  2 laps green Side lying 2x15 B 1 lap knees extended and 1 L knees flexed, green band SL hip abduction 3x10; clamshell 3x10    LAQ  10# 90 -30 ° 3x10 B  10# 90-30 ° 3x10 B    3x10 B LE 90-30 °, 10#       Bio step 6 min L4 5 min L4 5 min L4 6 min L4 6 min L6 6 min L7 6 min L7    Step ups            Bridge 3x10 Bridge 3x10  Bridge 3x10  Bridge 3x10 Bridge 3x10 Bridge 3x10, with heels on SB 3x10     Mini squats 3x10 in // bars    Mini squats 3x10 Leg press 60# 3x10 (double leg)     Hamstring curls  Seated green tband 3x10 B  Seated green tband 3x10 B         HEP: see hand out    [facility] Portal  Treatment/Session Summary:    · Response to Treatment: end range knee flexion remains irritable, right greater than left, due to continued stiffness of the knees and decreased flexibility of the quadriceps. · Communication/Consultation:  None today  · Equipment provided today:  Hand out on blood clot signs and symptoms  · Recommendations/Intent for next treatment session: Next visit will focus on ROM, strengthening, pain and edema management as indicated.     Total Treatment Billable Duration:  60 mins  PT Patient Time In/Time Out  Time In: 0032  Time Out: [address], DPT    Future Appointments   Date Time Provider Tamara Brewer   6/21/2021  3:30 PM Tanya Puri DPT [facility]   6/23/2021  3:30 PM TIGRE Bynum [facility]   6/29/2021  7:00 PM PABLO Pedraza [facility]   7/6/2021  3:30 PM PABLO Pedraza [facility]   7/8/2021  3:30 PM Tanya Puri DPT [facility]   7/12/2021  3:30 PM ITGRE BynumOFVALERIY [facility]   7/14/2021  3:30 PM Tanya Puri, DPT [facility]   7/19/2021  3:30 PM Marilin Mas DPT [facility]   7/21/2021  3:30 PM Marilin Mas DPT [facility]   7/26/2021  3:30 PM Pedro Paul PTA [facility]   7/28/2021  3:30 PM Marilin Mas DPT [facility]

## 2021-06-21 ENCOUNTER — HOSPITAL ENCOUNTER (OUTPATIENT)
Dept: PHYSICAL THERAPY | Age: 41
Discharge: HOME | End: 2021-06-21
Payer: COMMERCIAL

## 2021-06-21 PROCEDURE — 97016 VASOPNEUMATIC DEVICE THERAPY: CPT

## 2021-06-21 PROCEDURE — 97110 THERAPEUTIC EXERCISES: CPT

## 2021-06-21 NOTE — PROGRESS NOTES
Brinda Resendiz  : 1980  Primary:   Secondary:  [address] @ [address], [place].  [phone]   [phone]      OUTPATIENT PHYSICAL THERAPY: Daily Treatment Note  2021   ICD-10: Treatment Diagnosis: Pain in right knee (M25.561), Stiffness of right knee, not elsewhere classified (M25.661), Pain in left knee (M25.562) and Stiffness of left knee, not elsewhere classified (M25.662) and Muscle weakness (generalized) (M62.81) and Other abnormalities of gait and mobility (R26.89)     L ACL allograft, B chondroplasty and meniscectomy 21    Effective Dates: 2021 TO 2021 (90 days). Frequency/Duration: 2-3 times a week for 90 Days  GOALS: (Goals have been discussed and agreed upon with patient.)  Short-Term Functional Goals: Time Frame: 4 weeks  1. Pt to report compliance with HEP MET  2. Pt to achieve 0-125 AROM B knees for normal function and mechanics MET RIGHT, PROGRESSING ON LEFT  3. Pt to demonstrate normal gait level surfaces without assistive device MET   4. Pt to jun progressive ex program without increased pain or swelling. MET  Discharge Goals: Time Frame: 16 weeks  1. Pt to increase strength for reciprocal gait on stairs ONGOING  2. Pt to increase SLS > 20 sec B for safe amb all surfaces ONGOING  3. Pt to resume working without limitation inc navigating stairs consistently ONGOING  4. Pt to resume walking dogs without limitations ONGOING  _______________________________________________________________________________  Pre-treatment Symptoms/Complaints:  Pt notes the knees feel fine when sitting or at rest. Feels uncomfortable with standing and walking and describes a \"shifting\" or \"locking\" type sensation. Denies any actual mechanical locking or buckling but feels unsteady during weight bearing.    Pain: Initial: 4-5/10 B knees    Date of surgery: 21;   MD follow up 21   MD follow up 21  Post Session: no increase    Medications Last Reviewed:  6/21/2021     MD follow up: April 15, 2021    Updated Objective Findings:      Observation/Orthostatic Postural Assessment:          Steri-strips intact R med and lat portals; dressing with tegaderm intact over L knee with thick black dried blood contained;  Wearing brace locked at 0 on L   Palpation:          Good patellar mobility B  ROM:         AROM  Knee flex-ext R 102-0, 83-0 L        Calf tightness B  Strength:         Good quad set R, fair quad set L;  Further strength testing deferred at today's visit  Functional Mobility:         Gait/Ambulation:  Pt leans on B axillary crutches with step-to gait pattern, brace at 0        Transfers:  Pt uses UE assist and R LE         Stairs:  Step-to pattern or avoids currrently  Balance:          Unable to assess at today's visit    Tool Used: Lower Extremity Functional Scale (LEFS)  Score:  Initial: 8/80 Most Recent: X/80 (Date: -- )   Interpretation of Score: 20 questions each scored on a 5 point scale with 0 representing \"extreme difficulty or unable to perform\" and 4 representing \"no difficulty\". The lower the score, the greater the functional disability. 80/80 represents no disability. Minimal detectable change is 9 points. UPDATE: 3/22/21  L AROM 0-100 °  R AROM 0-115 °    3/26/21 update:   Left knee: 1-0-110 °   Right knee: 1-0-120 °   Continues to ambulate with hinged brace unlocked 0-60 °. Uses crutch for community mobility. Knee extension: 3+/5 B   SLR: 3/5 L with continued extensor lag, 4/5 R    4/21/21 update  B knee extension is 0 °    5/19/21 Update:   R knee AROM 1-0-125 °  L knee AROM 0-116 ° (painful)    MMT tested in sitting   R knee extension 5/5  R knee flexion 4/5    L knee extension 5/5  L knee flexion 4+/5         TREATMENT:   THERAPEUTIC ACTIVITY: ( see below for minutes): Therapeutic activities per grid below to improve mobility.   Required moderate verbal and manual cues to improve functional mobility . THERAPEUTIC EXERCISE: (see below for minutes):  Exercises per grid below to improve strength. Required moderate verbal and manual cues to promote proper body alignment, promote proper body posture, promote proper body mechanics and promote proper body breathing techniques. Progressed resistance, range, repetitions and complexity of movement as indicated. MANUAL THERAPY: (see below for minutes): Joint mobilization and Soft tissue mobilization was utilized and necessary because of the patient's restricted joint motion, painful spasm, loss of articular motion and restricted motion of soft tissue. MODALITIES: (see below for minutes):      for pain modulation    AQUATIC THERAPY (see below for minutes): Aquatic treatment performed per flow grid for Decreased muscle strength, Decreased endurance, Decreased static/dynamic balance and reactive control, Decreased activity endurance, Decompression, Ease of movement and Low impact and reduced weight bearing activity.     Date: 5/19/21 (visit 22) PN 5/25/21 (visit 23) 6/4/21 (visit 24) 6/7/21 (visit 26) (addendum from visit on 5/6 to correct visit counts) 6/9/21 (visit 27) 6/14/21 (visit 28) 6/16/21 (visit 29) 6/21/21 (visit 30)     Modalities: 15 min 15 min  15 min  15 min 15 min 15 min 15 min  15 min      Game ready B knees To B knees with medium compression To B knees  To B knees To B knees To B knees at coldest setting To B knees 15 min to B knees 15 min to B knees                  Manual Therapy:                                       Therapeutic Exercises: 45 min 45 min  40 min  40 min 30 min 45 min 45 min  45 min      Prone hip extension     2x10 B LE        SLR B 2x10 B LE 3x10 B  30x B  x20 B 2x15 B LE   3x10 B      gastroc stretch B 1 min hold on L2 and heel raises x30  2x1 min knees straight 1 min hold on L1 Heel raises x30 Heel raises x30 and incline stretch on L1, 1 min hold 2x1 min calf stretch, 3x10 calf raise 2x1 min calf stretch, 3x10 calf raise Gait mechanics level surfaces  Walking march x 2 laps           Heel slides  Therapist assisted stretching into knee flexion  Patellar mobs B; [id number] to ITB B  x30 L LE with strap. Hamstring and IT B stretch 30 sec hold x 4 B with strap  Hamstring stretch 30 sec hold x 4 B and heels x30 L LE with 5 sec hold [id number] hamstring stretch, ITB stretch, therapist assisted stretch into extension and flexion Dale stretch [id number] B, [id number] to HS, and ITB     FTKE    Lateral step up with foot on 4 in box, x20 ea LE   Lateral step up 6\" 3x10 B       Hip abduction 4 laps thick green in // bars 3 laps green loop  2 laps green Side lying 2x15 B 1 lap knees extended and 1 L knees flexed, green band SL hip abduction 3x10; clamshell 3x10      LAQ  10# 90 -30 ° 3x10 B  10# 90-30 ° 3x10 B    3x10 B LE 90-30 °, 10#         Bio step 6 min L4 5 min L4 5 min L4 6 min L4 6 min L6 6 min L7 6 min L7 6 min L7     Step ups              Bridge 3x10 Bridge 3x10  Bridge 3x10  Bridge 3x10 Bridge 3x10 Bridge 3x10, with heels on SB 3x10 Bridge 3x10, with heels on SB 3x10      Mini squats 3x10 in // bars    Mini squats 3x10 Leg press 60# 3x10 (double leg)  Leg press 60# 3x10 double leg     Hamstring curls  Seated green tband 3x10 B  Seated green tband 3x10 B           HEP: see hand out    [employer] Portal  Treatment/Session Summary:    · Response to Treatment: see MD note. Continues to be limited with progression of strengthening due to pain at the knees. · Communication/Consultation:  None today  · Equipment provided today:  Hand out on blood clot signs and symptoms  · Recommendations/Intent for next treatment session: Next visit will focus on ROM, strengthening, pain and edema management as indicated.     Total Treatment Billable Duration:  60 mins  PT Patient Time In/Time Out  Time In: 7643  Time Out: [address], DPT    Future Appointments   Date Time Provider Tamara Brewer   6/23/2021  3:30 PM [facility]   6/29/2021  7:00 PM [facility]   7/6/2021  3:30 PM [facility]ij[facility] Croft[facility]   7/8/2021  3:30 PM Vijay Croft, DPT [facility]   7/12/2021  3:30 PM Ruby Mow, PTA [facility]   7/14/2021  3:30 PM Vijay Croft, DPT [facility]   7/19/2021  3:30 PM Vijay Croft, DPT [facility]   7/21/2021  3:30 PM Vijay Croft, DPT [facility]   7/26/2021  3:30 PM Ruby Mow, PTA [facility]   7/28/2021  3:30 PM [facility]

## 2021-06-21 NOTE — PROGRESS NOTES
Summer Gordillo   (:1980) [address], [address] [place] [address]  [phone]   [phone]           PHYSICIAN COMMUNICATION    REFERRING PHYSICIAN: Tessa Byrd MD  Return Physician Appointment: 21  MEDICAL/REFERRING DIAGNOSIS:  · Pain in right knee [M25.561]  · Pain in left knee [M25.562]      ASSESSMENT:  DATE: 2021    PROGRESS: Summer Gordillo has been gradually progressing with therapy, but she is continuing to have issues of stiffness and pain at both knees. This is greater on the left, but is present bilaterally. She has good mobility at the joints, but has stiffness and discomfort with overpressure into knee flexion. She has been gradually progressing with restoration of strength, but this has been somewhat limited due to continued pain. RECOMMENDATIONS: Plan to continue. Will continue to progress with strengthening and stabilization training as tolerated by symptom irritation.      Thank you,  SONIA HenleyT

## 2021-06-23 ENCOUNTER — HOSPITAL ENCOUNTER (OUTPATIENT)
Dept: PHYSICAL THERAPY | Age: 41
Discharge: HOME | End: 2021-06-23
Payer: COMMERCIAL

## 2021-06-29 ENCOUNTER — HOSPITAL ENCOUNTER (OUTPATIENT)
Dept: PHYSICAL THERAPY | Age: 41
Discharge: HOME | End: 2021-06-29
Payer: COMMERCIAL

## 2021-07-14 ENCOUNTER — APPOINTMENT (OUTPATIENT)
Dept: PHYSICAL THERAPY | Age: 41
End: 2021-07-14

## 2021-07-19 ENCOUNTER — APPOINTMENT (OUTPATIENT)
Dept: PHYSICAL THERAPY | Age: 41
End: 2021-07-19

## 2021-07-21 ENCOUNTER — APPOINTMENT (OUTPATIENT)
Dept: PHYSICAL THERAPY | Age: 41
End: 2021-07-21

## 2021-07-26 ENCOUNTER — APPOINTMENT (OUTPATIENT)
Dept: PHYSICAL THERAPY | Age: 41
End: 2021-07-26

## 2021-07-28 ENCOUNTER — APPOINTMENT (OUTPATIENT)
Dept: PHYSICAL THERAPY | Age: 41
End: 2021-07-28

## 2023-06-27 ENCOUNTER — HOSPITAL ENCOUNTER (OUTPATIENT)
Dept: MAMMOGRAPHY | Age: 43
Discharge: HOME | End: 2023-06-30
Payer: COMMERCIAL

## 2023-06-27 DIAGNOSIS — Z12.31 VISIT FOR SCREENING MAMMOGRAM: ICD-10-CM

## 2023-06-27 PROCEDURE — 77063 BREAST TOMOSYNTHESIS BI: CPT

## (undated) DEVICE — WEREWOLF FLOW 90 COBLATION WAND: Brand: COBLATION

## (undated) DEVICE — 4-PORT MANIFOLD: Brand: NEPTUNE 2

## (undated) DEVICE — STRIP,CLOSURE,WOUND,MEDI-STRIP,1/2X4: Brand: MEDLINE

## (undated) DEVICE — 2DE12 2-0 UNDYD MONODERM 14X14: Brand: 2DE12 2-0 UNDYD MONODERM 14X14

## (undated) DEVICE — STERILE HOOK LOCK LATEX FREE ELASTIC BANDAGE 6INX5YD: Brand: HOOK LOCK™

## (undated) DEVICE — SUTURE PDS II SZ 0 L27IN ABSRB VLT L26MM CT-2 1/2 CIR Z334H

## (undated) DEVICE — SOLUTION IRRIG 3000ML 0.9% SOD CHL FLX CONT 0797208] ICU MEDICAL INC]

## (undated) DEVICE — BLADE SCALP SURG BARD-PARK 11 --

## (undated) DEVICE — SET IRRIG DST FLX M CONN

## (undated) DEVICE — PREP SKN CHLRAPRP APL 26ML STR --

## (undated) DEVICE — T-DRAPE,EXTREMITY,STERILE: Brand: MEDLINE

## (undated) DEVICE — REM POLYHESIVE ADULT PATIENT RETURN ELECTRODE: Brand: VALLEYLAB

## (undated) DEVICE — SUTURE NONABSORBABLE BRAIDED 5-0 30 IN ETHBND EXCEL D7809

## (undated) DEVICE — ZIMMER® STERILE DISPOSABLE TOURNIQUET CUFF WITH PLC, DUAL PORT, SINGLE BLADDER, 34 IN. (86 CM)

## (undated) DEVICE — STOCKINETTE,IMPERVIOUS,12X48,STERILE: Brand: MEDLINE

## (undated) DEVICE — CARDINAL HEALTH FLEXIBLE LIGHT HANDLE COVER: Brand: CARDINAL HEALTH

## (undated) DEVICE — SUTURE STRATAFIX SPRL MCRYL + SZ 3-0 L18IN ABSRB UD PS-2 SXMP1B107

## (undated) DEVICE — SKIN MARKER,REGULAR TIP WITH RULER AND LABELS: Brand: DEVON

## (undated) DEVICE — SET IRRIG W 96IN TBNG 4 LN FLX BG

## (undated) DEVICE — [RESECTOR CUTTER, ARTHROSCOPIC SHAVER BLADE,  DO NOT RESTERILIZE,  DO NOT USE IF PACKAGE IS DAMAGED,  KEEP DRY,  KEEP AWAY FROM SUNLIGHT]: Brand: FORMULA

## (undated) DEVICE — INFLOW CASSETTE TUBING, DO NOT USE IF PACKAGE IS DAMAGED: Brand: CROSSFLOW

## (undated) DEVICE — PRECISION THIN (9.0 X 0.38 X 31.0MM)

## (undated) DEVICE — BUTTON SWITCH PENCIL BLADE ELECTRODE, HOLSTER: Brand: EDGE

## (undated) DEVICE — SPONGE GZ W4XL4IN COT 12 PLY TYP VII WVN C FLD DSGN

## (undated) DEVICE — STERILE POLYISOPRENE POWDER-FREE SURGICAL GLOVES: Brand: PROTEXIS

## (undated) DEVICE — KNEE ARTHRO ACL-DR BAUMGARTEN: Brand: MEDLINE INDUSTRIES, INC.

## (undated) DEVICE — INTENDED FOR TISSUE SEPARATION, AND OTHER PROCEDURES THAT REQUIRE A SHARP SURGICAL BLADE TO PUNCTURE OR CUT.: Brand: BARD-PARKER ® STAINLESS STEEL BLADES

## (undated) DEVICE — BANDAGE COBAN 6 IN WND 6INX5YD FOAM

## (undated) DEVICE — SUTURE ETHLN SZ 2-0 L18IN NONABSORBABLE BLK L26MM PS 3/8 585H

## (undated) DEVICE — ADHESIVE LIQ H2O INSOLUBLE 3 CC LO RISK N STN MASTISOL

## (undated) DEVICE — OUTFLOW CASSETTE TUBING, DO NOT USE IF PACKAGE IS DAMAGED: Brand: CROSSFLOW

## (undated) DEVICE — SUTURE MCRYL SZ 2-0 L27IN ABSRB UD CP-1 1 L36MM 1/2 CIR REV Y266H

## (undated) DEVICE — CLEAR-TRAC SCR CANN 9MM LTX FREE: Brand: CLEAR-TRAC

## (undated) DEVICE — OPTIFOAM GENTLE SA, POSTOP, 4X8: Brand: MEDLINE